# Patient Record
Sex: FEMALE | Race: WHITE | Employment: OTHER
[De-identification: names, ages, dates, MRNs, and addresses within clinical notes are randomized per-mention and may not be internally consistent; named-entity substitution may affect disease eponyms.]

---

## 2017-02-08 ENCOUNTER — OFFICE VISIT (OUTPATIENT)
Dept: ONCOLOGY | Facility: CLINIC | Age: 62
End: 2017-02-08

## 2017-02-08 VITALS
WEIGHT: 150 LBS | HEART RATE: 86 BPM | DIASTOLIC BLOOD PRESSURE: 73 MMHG | SYSTOLIC BLOOD PRESSURE: 127 MMHG | BODY MASS INDEX: 22.73 KG/M2 | TEMPERATURE: 98.1 F | HEIGHT: 68 IN

## 2017-02-08 DIAGNOSIS — G62.9 NEUROPATHY: ICD-10-CM

## 2017-02-08 DIAGNOSIS — M81.0 OSTEOPOROSIS: ICD-10-CM

## 2017-02-08 DIAGNOSIS — C50.912 MALIGNANT NEOPLASM OF LEFT FEMALE BREAST, UNSPECIFIED SITE OF BREAST: Primary | ICD-10-CM

## 2017-02-08 DIAGNOSIS — Z12.39 ENCOUNTER FOR BREAST CANCER SCREENING OTHER THAN MAMMOGRAM: ICD-10-CM

## 2017-02-08 PROCEDURE — 99214 OFFICE O/P EST MOD 30 MIN: CPT | Performed by: INTERNAL MEDICINE

## 2017-02-08 RX ORDER — ZONISAMIDE 50 MG/1
2 CAPSULE ORAL NIGHTLY
COMMUNITY
Start: 2017-01-24 | End: 2017-06-21 | Stop reason: ALTCHOICE

## 2017-02-08 RX ORDER — AZITHROMYCIN 250 MG/1
TABLET, FILM COATED ORAL
Qty: 1 PACKET | Refills: 0 | Status: SHIPPED | OUTPATIENT
Start: 2017-02-08 | End: 2020-11-06 | Stop reason: ALTCHOICE

## 2017-03-07 RX ORDER — SODIUM CHLORIDE 9 MG/ML
100 INJECTION, SOLUTION INTRAVENOUS CONTINUOUS
Status: CANCELLED | OUTPATIENT
Start: 2017-06-21

## 2017-03-07 RX ORDER — METHYLPREDNISOLONE SODIUM SUCCINATE 125 MG/2ML
125 INJECTION, POWDER, LYOPHILIZED, FOR SOLUTION INTRAMUSCULAR; INTRAVENOUS ONCE
Status: CANCELLED | OUTPATIENT
Start: 2017-06-21 | End: 2017-03-13

## 2017-03-07 RX ORDER — 0.9 % SODIUM CHLORIDE 0.9 %
10 VIAL (ML) INJECTION ONCE
Status: CANCELLED | OUTPATIENT
Start: 2017-06-21 | End: 2017-03-13

## 2017-03-07 RX ORDER — DIPHENHYDRAMINE HYDROCHLORIDE 50 MG/ML
50 INJECTION INTRAMUSCULAR; INTRAVENOUS ONCE
Status: CANCELLED | OUTPATIENT
Start: 2017-06-21 | End: 2017-03-13

## 2017-03-20 RX ORDER — EXEMESTANE 25 MG/1
TABLET ORAL
Qty: 90 TABLET | Refills: 3 | Status: SHIPPED | OUTPATIENT
Start: 2017-03-20 | End: 2018-03-09 | Stop reason: SDUPTHER

## 2017-05-17 RX ORDER — ALPRAZOLAM 0.5 MG/1
0.5 TABLET ORAL 2 TIMES DAILY PRN
Qty: 60 TABLET | Refills: 5 | Status: SHIPPED | OUTPATIENT
Start: 2017-05-17 | End: 2017-12-08 | Stop reason: SDUPTHER

## 2017-06-21 ENCOUNTER — HOSPITAL ENCOUNTER (OUTPATIENT)
Dept: INFUSION THERAPY | Age: 62
Discharge: HOME | End: 2017-06-21
Payer: COMMERCIAL

## 2017-06-21 VITALS
SYSTOLIC BLOOD PRESSURE: 133 MMHG | RESPIRATION RATE: 22 BRPM | DIASTOLIC BLOOD PRESSURE: 76 MMHG | TEMPERATURE: 98.6 F | HEART RATE: 80 BPM

## 2017-06-21 DIAGNOSIS — M81.0 OSTEOPOROSIS: ICD-10-CM

## 2017-06-21 PROCEDURE — 6360000002 HC RX W HCPCS

## 2017-06-21 PROCEDURE — 96401 CHEMO ANTI-NEOPL SQ/IM: CPT

## 2017-06-21 RX ORDER — METHYLPREDNISOLONE SODIUM SUCCINATE 125 MG/2ML
125 INJECTION, POWDER, LYOPHILIZED, FOR SOLUTION INTRAMUSCULAR; INTRAVENOUS ONCE
Status: CANCELLED | OUTPATIENT
Start: 2017-06-21 | End: 2017-06-21

## 2017-06-21 RX ORDER — 0.9 % SODIUM CHLORIDE 0.9 %
10 VIAL (ML) INJECTION ONCE
Status: CANCELLED | OUTPATIENT
Start: 2017-06-21 | End: 2017-06-21

## 2017-06-21 RX ORDER — SODIUM CHLORIDE 9 MG/ML
100 INJECTION, SOLUTION INTRAVENOUS CONTINUOUS
Status: CANCELLED | OUTPATIENT
Start: 2017-06-21

## 2017-06-21 RX ORDER — DIPHENHYDRAMINE HYDROCHLORIDE 50 MG/ML
50 INJECTION INTRAMUSCULAR; INTRAVENOUS ONCE
Status: CANCELLED | OUTPATIENT
Start: 2017-06-21 | End: 2017-06-21

## 2017-06-21 RX ADMIN — DENOSUMAB 60 MG: 60 INJECTION SUBCUTANEOUS at 15:32

## 2017-08-11 ENCOUNTER — OFFICE VISIT (OUTPATIENT)
Dept: ONCOLOGY | Age: 62
End: 2017-08-11
Payer: COMMERCIAL

## 2017-08-11 VITALS
SYSTOLIC BLOOD PRESSURE: 150 MMHG | BODY MASS INDEX: 21.82 KG/M2 | HEIGHT: 68 IN | RESPIRATION RATE: 16 BRPM | TEMPERATURE: 98.4 F | DIASTOLIC BLOOD PRESSURE: 92 MMHG | WEIGHT: 144 LBS | HEART RATE: 94 BPM

## 2017-08-11 DIAGNOSIS — G62.9 NEUROPATHY: ICD-10-CM

## 2017-08-11 DIAGNOSIS — C50.912 MALIGNANT NEOPLASM OF LEFT FEMALE BREAST, UNSPECIFIED SITE OF BREAST: Primary | ICD-10-CM

## 2017-08-11 PROCEDURE — 99214 OFFICE O/P EST MOD 30 MIN: CPT | Performed by: INTERNAL MEDICINE

## 2017-08-11 RX ORDER — DULOXETIN HYDROCHLORIDE 20 MG/1
20 CAPSULE, DELAYED RELEASE ORAL DAILY
Qty: 30 CAPSULE | Refills: 3 | Status: SHIPPED | OUTPATIENT
Start: 2017-08-11 | End: 2017-11-27 | Stop reason: SDUPTHER

## 2017-09-01 ENCOUNTER — HOSPITAL ENCOUNTER (OUTPATIENT)
Dept: WOMENS IMAGING | Age: 62
Discharge: HOME | End: 2017-09-01
Payer: COMMERCIAL

## 2017-09-01 ENCOUNTER — HOSPITAL ENCOUNTER (OUTPATIENT)
Age: 62
Setting detail: SPECIMEN
Discharge: HOME | End: 2017-09-01
Payer: COMMERCIAL

## 2017-09-01 DIAGNOSIS — Z12.39 ENCOUNTER FOR BREAST CANCER SCREENING OTHER THAN MAMMOGRAM: ICD-10-CM

## 2017-09-01 DIAGNOSIS — C50.912 MALIGNANT NEOPLASM OF LEFT FEMALE BREAST, UNSPECIFIED SITE OF BREAST: ICD-10-CM

## 2017-09-01 PROCEDURE — G0145 SCR C/V CYTO,THINLAYER,RESCR: HCPCS

## 2017-09-01 PROCEDURE — G0202 SCR MAMMO BI INCL CAD: HCPCS

## 2017-09-11 LAB — CYTOLOGY REPORT: NORMAL

## 2017-09-12 ENCOUNTER — OFFICE VISIT (OUTPATIENT)
Dept: PODIATRY | Age: 62
End: 2017-09-12
Payer: COMMERCIAL

## 2017-09-12 ENCOUNTER — HOSPITAL ENCOUNTER (OUTPATIENT)
Age: 62
Discharge: HOME | End: 2017-09-12
Payer: COMMERCIAL

## 2017-09-12 ENCOUNTER — HOSPITAL ENCOUNTER (OUTPATIENT)
Dept: GENERAL RADIOLOGY | Age: 62
Discharge: HOME | End: 2017-09-12
Payer: COMMERCIAL

## 2017-09-12 VITALS
DIASTOLIC BLOOD PRESSURE: 83 MMHG | RESPIRATION RATE: 20 BRPM | HEIGHT: 68 IN | HEART RATE: 73 BPM | SYSTOLIC BLOOD PRESSURE: 141 MMHG | TEMPERATURE: 97.6 F

## 2017-09-12 DIAGNOSIS — M79.672 PAIN IN LEFT FOOT: ICD-10-CM

## 2017-09-12 DIAGNOSIS — M20.12 HALLUX VALGUS, LEFT: ICD-10-CM

## 2017-09-12 DIAGNOSIS — M20.12 HALLUX VALGUS, LEFT: Primary | ICD-10-CM

## 2017-09-12 DIAGNOSIS — M21.272 METATARSUS PRIMUS ELEVATUS, LEFT: ICD-10-CM

## 2017-09-12 PROCEDURE — 73630 X-RAY EXAM OF FOOT: CPT

## 2017-09-12 PROCEDURE — 99203 OFFICE O/P NEW LOW 30 MIN: CPT | Performed by: PODIATRIST

## 2017-09-20 PROBLEM — G63 NEUROPATHY ASSOCIATED WITH CANCER (HCC): Status: ACTIVE | Noted: 2017-09-20

## 2017-09-20 PROBLEM — C80.1 NEUROPATHY ASSOCIATED WITH CANCER (HCC): Status: ACTIVE | Noted: 2017-09-20

## 2017-09-26 ENCOUNTER — OFFICE VISIT (OUTPATIENT)
Dept: PODIATRY | Age: 62
End: 2017-09-26
Payer: COMMERCIAL

## 2017-09-26 VITALS
SYSTOLIC BLOOD PRESSURE: 128 MMHG | RESPIRATION RATE: 16 BRPM | TEMPERATURE: 98.5 F | HEIGHT: 68 IN | HEART RATE: 87 BPM | DIASTOLIC BLOOD PRESSURE: 78 MMHG

## 2017-09-26 DIAGNOSIS — M21.272 METATARSUS PRIMUS ELEVATUS, LEFT: ICD-10-CM

## 2017-09-26 DIAGNOSIS — M20.12 HALLUX VALGUS, LEFT: Primary | ICD-10-CM

## 2017-09-26 DIAGNOSIS — M79.672 PAIN IN LEFT FOOT: ICD-10-CM

## 2017-09-26 PROCEDURE — 99213 OFFICE O/P EST LOW 20 MIN: CPT | Performed by: PODIATRIST

## 2017-09-26 NOTE — PROGRESS NOTES
Subjective:      Patient ID: Aman Trejo is a 63 y.o. female. Presents for surgical consult   Cc: Left foot pain         \"Misery index slightly better than initial visit , but\"  HPI left 1st MPJ pain x 2-3 years progressive          Overall deformity 15-20 years         Only treatment ; shoe modifications ; limited relief     Review of Systems   MRR: last visit 9/12/2017            Post chemotherapy NEUROPATHY  Past Medical History:   Diagnosis Date    Anxiety     Bone spur     ON SPINE-BACK PAIN    Breast cancer ([address] 2014    LEFT     Depression     Essential hypertension     Osteoarthritis     Seasonal allergies     Wears dentures     FULL UPPER/PARTIAL LOWER/INSTRUCTED NOT TO USE ADHESIVE    Wrist fracture 2005     Past Surgical History:   Procedure Laterality Date    BREAST BIOPSY  2-18-14    Needle aspiration biopsy    BREAST RECONSTRUCTION  4-2015    Still in progress, initiated in April 2015    BREAST RECONSTRUCTION Bilateral     BREAST SURGERY Left 3/18/14    Dr.Wenger Tim Camacho    CARPAL TUNNEL RELEASE  2005    right    MASTECTOMY Left 03/18/2014    with sentinel node biopsy    OTHER SURGICAL HISTORY  approx 2005    Tendon surgery (Right hand)    OTHER SURGICAL HISTORY      PORT REMOVAL    TUNNELED VENOUS PORT PLACEMENT Right 4/30/14     Allergies   Allergen Reactions    No Known Allergies        Current Outpatient Prescriptions:     traMADol (ULTRAM) 50 MG tablet, TAKE 1-2 TABLETS BY MOUTH DAILY PRN, Disp: 60 tablet, Rfl: 0    Na Sulfate-K Sulfate-Mg Sulf (SUPREP BOWEL PREP KIT) 17.5-3.13-1.6 GM/180ML SOLN, Take as directed, Disp: 2 Bottle, Rfl: 0    DULoxetine (CYMBALTA) 20 MG extended release capsule, Take 1 capsule by mouth daily, Disp: 30 capsule, Rfl: 3    lisinopril (PRINIVIL;ZESTRIL) 5 MG tablet, TAKE 1 TABLET BY MOUTH DAILY, Disp: 60 tablet, Rfl: 3    Biotin 17977 MCG TBDP, Take by mouth daily, Disp: , Rfl:     ALPRAZolam (XANAX) 0.5 MG tablet, Take 1 tablet by mouth 2 times daily as needed for Anxiety, Disp: 60 tablet, Rfl: 5    exemestane (AROMASIN) 25 MG chemo tablet, TAKE 1 TABLET BY MOUTH DAILY, Disp: 90 tablet, Rfl: 3    prochlorperazine (COMPAZINE) 10 MG tablet, TAKE 1 TABLET BY MOUTH EVERY 6 HOURS AS NEEDED FOR NAUSEA, Disp: 60 tablet, Rfl: 1    b complex vitamins capsule, Take 1 capsule by mouth daily, Disp: , Rfl:     Dextromethorphan-Guaifenesin (MUCINEX DM)  MG TB12, Take 1 tablet by mouth daily as needed, Disp: , Rfl:     fexofenadine (ALLEGRA) 180 MG tablet, Take 180 mg by mouth daily , Disp: , Rfl:     Calcium 1500 MG TABS, Take by mouth daily, Disp: , Rfl:     Cholecalciferol (VITAMIN D3) 3000 UNITS TABS, Take by mouth daily, Disp: , Rfl:     aspirin 81 MG tablet, Take 81 mg by mouth daily. , Disp: , Rfl:         Objective:   Physical Exam 63 Y/O WF , NAD, A&O x 4, WD/WN                           VSS, Afebrile,      LLE ; foot ; +PMT on prominent 1st MPJ exostosis / eminence                         \"+trackbound\" exam on R.O.M. reduction of HAV                         X-ray : **surpringly good bone quality v. Dexa scan review                                   Long 1st Metatarsal ; +1-2mm parabola                                    TSP 7+                                   FISHER 35                                   IM 14                                    Overall ; stage 3/4 HAV combination deformity                          Vascular : 2/4 readily palpable pedal pulses / +CFT , 2 sec  , +hair growth to hallux Left                         Neuro : +2 DTR(s)                                       EHL / FHL intact function                                       2-pt and light touch epicritic decreased dorsal and plantar forefoot and toes                                                                Assessment:      Stage 3/4 HAV ; combination deformity -- painful  Bone quality acceptable   Spontaneous healing parameters adequate     REC: Adán osteotomy / HAV

## 2017-09-26 NOTE — MR AVS SNAPSHOT
After Visit Summary             Gian Salter   2017 3:30 PM   Office Visit    Description:  Female : 1955   Provider:  Tonja Stack DPM   Department:  [facility] Podiatry              Your Follow-Up and Future Appointments         Below is a list of your follow-up and future appointments. This may not be a complete list as you may have made appointments directly with providers that we are not aware of or your providers may have made some for you. Please call your providers to confirm appointments. It is important to keep your appointments. Please bring your current insurance card, photo ID, co-pay, and all medication bottles to your appointment. If self-pay, payment is expected at the time of service. Your To-Do List     Future Appointments Provider Department Dept Phone    2017 3:30 PM [facility] MED ONC ROOM 9 [facility] AT THE [facility] MED [phone]    2018 3:40 PM Carmen Flores  [facility] [phone]    Please arrive 15 minutes prior to appointment, bring photo ID and insurance card. 3/21/2018 3:30 PM MD Makenna Rosario [phone]    Please arrive 15 minutes prior to appointment, bring photo ID and insurance card. Information from Your Visit        Department     Name Address Phone Fax    [address]  [place] [address] [phone]      You Were Seen for:         Comments    Hallux valgus, left   [[id number]]         Vital Signs     Blood Pressure Pulse Temperature Respirations Height Smoking Status    128/78 87 98.5 °F (36.9 °C) (Tympanic) 16 5' 8\" (1.727 m) Former Smoker      Instructions     Patient Discharge Instructions    CALL [phone] for questions regarding care. OFFICE HOURS ARE WED AND Thursday 8 A. M. TO 4:30 P. M. And subject to change without notice    Discharge instructions for the patient's plan of care.        Left foot Dr. Marly Josue to call with surgery date in November. Next appointment:  Future Appointments  Date Time Provider Yang Leóni   [facility]2017 3[facility]30 [facility] [place]   2/14/2018 3:40 PM MD Earle Calderonf Onc [facility]   3/21/2018 3:30 PM MD Samia Zamora         Comments: We hope we gave you VERY GOOD care today! Medications and Orders      Your Current Medications Are              traMADol (ULTRAM) 50 MG tablet TAKE 1-2 TABLETS BY MOUTH DAILY PRN    Na Sulfate-K Sulfate-Mg Sulf (SUPREP BOWEL PREP KIT) 17.5-3.13-1.6 GM/180ML SOLN Take as directed    DULoxetine (CYMBALTA) 20 MG extended release capsule Take 1 capsule by mouth daily    lisinopril (PRINIVIL;ZESTRIL) 5 MG tablet TAKE 1 TABLET BY MOUTH DAILY    Biotin 66633 MCG TBDP Take by mouth daily    ALPRAZolam (XANAX) 0.5 MG tablet Take 1 tablet by mouth 2 times daily as needed for Anxiety    exemestane (AROMASIN) 25 MG chemo tablet TAKE 1 TABLET BY MOUTH DAILY    prochlorperazine (COMPAZINE) 10 MG tablet TAKE 1 TABLET BY MOUTH EVERY 6 HOURS AS NEEDED FOR NAUSEA    b complex vitamins capsule Take 1 capsule by mouth daily    Dextromethorphan-Guaifenesin (MUCINEX DM)  MG TB12 Take 1 tablet by mouth daily as needed    fexofenadine (ALLEGRA) 180 MG tablet Take 180 mg by mouth daily     Calcium 1500 MG TABS Take by mouth daily    Cholecalciferol (VITAMIN D3) 3000 UNITS TABS Take by mouth daily    aspirin 81 MG tablet Take 81 mg by mouth daily.       Allergies              No Known Allergies          Additional Information        Basic Information     Date Of Birth Sex Race Ethnicity Preferred Language    1955 Female White Non-/Non  English      Problem List as of 9/26/2017  Date Reviewed: 9/26/2017                Neuropathy associated with cancer [facility]) / breast cancer treatment    Chronic bilateral low back pain without sciatica 4. Enter your Social Security Number (xxx-[id number]) and Date of Birth (mm/dd/yyyy) as indicated and click Submit. You will be taken to the next sign-up page. 5. Create a [employer] ID. This will be your [employer] login ID and cannot be changed, so think of one that is secure and easy to remember. 6. Create a [employer] password. You can change your password at any time. 7. Enter your Password Reset Question and Answer. This can be used at a later time if you forget your password. 8. Enter your e-mail address. You will receive e-mail notification when new information is available in [address] 9. Click Sign Up. You can now view your medical record. Additional Information  If you have questions, please contact the physician practice where you receive care. Remember, [employer] is NOT to be used for urgent needs. For medical emergencies, dial 911. For questions regarding your [employer] account call [phone]. If you have a clinical question, please call your doctor's office.

## 2017-09-26 NOTE — PATIENT INSTRUCTIONS
Patient Discharge Instructions    CALL [phone] for questions regarding care. OFFICE HOURS ARE WED AND Thursday 8 A. M. TO 4:30 P. M. And subject to change without notice    Discharge instructions for the patient's plan of care. Left foot  Dr. Marly Josue to call with surgery date in November. Next appointment:  Future Appointments  Date Time Provider Yang Skelton   12/20/2017 3:30 PM Mansoor Singh MED ONC ROOM 9 [facility] MED ONC [place]   2/14/2018 3:40 PM Cori Rendon MD [place] [facility] [place]   3/21/2018 3:30 PM MD Samia Zamora         Comments: We hope we gave you VERY GOOD care today!

## 2017-10-03 ENCOUNTER — TELEPHONE (OUTPATIENT)
Dept: GASTROENTEROLOGY | Age: 62
End: 2017-10-03

## 2017-11-07 ENCOUNTER — ANESTHESIA EVENT (OUTPATIENT)
Dept: OPERATING ROOM | Age: 62
End: 2017-11-07
Payer: COMMERCIAL

## 2017-11-07 PROBLEM — Q66.6: Status: ACTIVE | Noted: 2017-11-07

## 2017-11-07 PROBLEM — Q66.219: Status: ACTIVE | Noted: 2017-11-07

## 2017-11-07 PROBLEM — M20.12 HALLUX VALGUS (ACQUIRED), LEFT FOOT: Status: ACTIVE | Noted: 2017-11-07

## 2017-11-08 ENCOUNTER — ANESTHESIA (OUTPATIENT)
Dept: OPERATING ROOM | Age: 62
End: 2017-11-08
Payer: COMMERCIAL

## 2017-11-08 ENCOUNTER — HOSPITAL ENCOUNTER (OUTPATIENT)
Age: 62
Setting detail: OUTPATIENT SURGERY
Discharge: HOME | End: 2017-11-08
Attending: PODIATRIST | Admitting: PODIATRIST
Payer: COMMERCIAL

## 2017-11-08 VITALS
WEIGHT: 138 LBS | SYSTOLIC BLOOD PRESSURE: 127 MMHG | OXYGEN SATURATION: 97 % | DIASTOLIC BLOOD PRESSURE: 77 MMHG | HEIGHT: 68 IN | BODY MASS INDEX: 20.92 KG/M2 | HEART RATE: 75 BPM | TEMPERATURE: 97.6 F | RESPIRATION RATE: 16 BRPM

## 2017-11-08 VITALS — TEMPERATURE: 101.7 F | SYSTOLIC BLOOD PRESSURE: 85 MMHG | OXYGEN SATURATION: 97 % | DIASTOLIC BLOOD PRESSURE: 42 MMHG

## 2017-11-08 PROCEDURE — 2580000003 HC RX 258: Performed by: PODIATRIST

## 2017-11-08 PROCEDURE — 88304 TISSUE EXAM BY PATHOLOGIST: CPT

## 2017-11-08 PROCEDURE — 6360000002 HC RX W HCPCS: Performed by: PODIATRIST

## 2017-11-08 PROCEDURE — 97116 GAIT TRAINING THERAPY: CPT

## 2017-11-08 PROCEDURE — 2500000003 HC RX 250 WO HCPCS: Performed by: PODIATRIST

## 2017-11-08 PROCEDURE — 3600000013 HC SURGERY LEVEL 3 ADDTL 15MIN: Performed by: PODIATRIST

## 2017-11-08 PROCEDURE — 7100000010 HC PHASE II RECOVERY - FIRST 15 MIN: Performed by: PODIATRIST

## 2017-11-08 PROCEDURE — 3600000003 HC SURGERY LEVEL 3 BASE: Performed by: PODIATRIST

## 2017-11-08 PROCEDURE — 6370000000 HC RX 637 (ALT 250 FOR IP): Performed by: PODIATRIST

## 2017-11-08 PROCEDURE — 2720000010 HC SURG SUPPLY STERILE: Performed by: PODIATRIST

## 2017-11-08 PROCEDURE — C1713 ANCHOR/SCREW BN/BN,TIS/BN: HCPCS | Performed by: PODIATRIST

## 2017-11-08 PROCEDURE — 2500000003 HC RX 250 WO HCPCS: Performed by: NURSE ANESTHETIST, CERTIFIED REGISTERED

## 2017-11-08 PROCEDURE — 7100000001 HC PACU RECOVERY - ADDTL 15 MIN: Performed by: PODIATRIST

## 2017-11-08 PROCEDURE — 6360000002 HC RX W HCPCS: Performed by: NURSE ANESTHETIST, CERTIFIED REGISTERED

## 2017-11-08 PROCEDURE — 7100000011 HC PHASE II RECOVERY - ADDTL 15 MIN: Performed by: PODIATRIST

## 2017-11-08 PROCEDURE — 88311 DECALCIFY TISSUE: CPT

## 2017-11-08 PROCEDURE — 3700000001 HC ADD 15 MINUTES (ANESTHESIA): Performed by: PODIATRIST

## 2017-11-08 PROCEDURE — 7100000000 HC PACU RECOVERY - FIRST 15 MIN: Performed by: PODIATRIST

## 2017-11-08 PROCEDURE — 28296 COR HLX VLGS DSTL MTAR OSTEO: CPT | Performed by: PODIATRIST

## 2017-11-08 PROCEDURE — 3700000000 HC ANESTHESIA ATTENDED CARE: Performed by: PODIATRIST

## 2017-11-08 RX ORDER — MIDAZOLAM HYDROCHLORIDE 1 MG/ML
INJECTION INTRAMUSCULAR; INTRAVENOUS PRN
Status: DISCONTINUED | OUTPATIENT
Start: 2017-11-08 | End: 2017-11-08 | Stop reason: SDUPTHER

## 2017-11-08 RX ORDER — PROMETHAZINE HYDROCHLORIDE 25 MG/ML
6.25 INJECTION, SOLUTION INTRAMUSCULAR; INTRAVENOUS
Status: DISCONTINUED | OUTPATIENT
Start: 2017-11-08 | End: 2017-11-08 | Stop reason: HOSPADM

## 2017-11-08 RX ORDER — FENTANYL CITRATE 50 UG/ML
25 INJECTION, SOLUTION INTRAMUSCULAR; INTRAVENOUS EVERY 5 MIN PRN
Status: DISCONTINUED | OUTPATIENT
Start: 2017-11-08 | End: 2017-11-08 | Stop reason: HOSPADM

## 2017-11-08 RX ORDER — MORPHINE SULFATE 2 MG/ML
2 INJECTION, SOLUTION INTRAMUSCULAR; INTRAVENOUS
Status: DISCONTINUED | OUTPATIENT
Start: 2017-11-08 | End: 2017-11-08 | Stop reason: HOSPADM

## 2017-11-08 RX ORDER — KETOROLAC TROMETHAMINE 15 MG/ML
15 INJECTION, SOLUTION INTRAMUSCULAR; INTRAVENOUS ONCE
Status: COMPLETED | OUTPATIENT
Start: 2017-11-08 | End: 2017-11-08

## 2017-11-08 RX ORDER — ONDANSETRON 2 MG/ML
4 INJECTION INTRAMUSCULAR; INTRAVENOUS EVERY 6 HOURS PRN
Status: DISCONTINUED | OUTPATIENT
Start: 2017-11-08 | End: 2017-11-08 | Stop reason: HOSPADM

## 2017-11-08 RX ORDER — METOCLOPRAMIDE HYDROCHLORIDE 5 MG/ML
10 INJECTION INTRAMUSCULAR; INTRAVENOUS
Status: DISCONTINUED | OUTPATIENT
Start: 2017-11-08 | End: 2017-11-08 | Stop reason: HOSPADM

## 2017-11-08 RX ORDER — HYDROCODONE BITARTRATE AND ACETAMINOPHEN 5; 325 MG/1; MG/1
2 TABLET ORAL PRN
Status: COMPLETED | OUTPATIENT
Start: 2017-11-08 | End: 2017-11-08

## 2017-11-08 RX ORDER — BUPIVACAINE HYDROCHLORIDE AND EPINEPHRINE 5; 5 MG/ML; UG/ML
INJECTION, SOLUTION EPIDURAL; INTRACAUDAL; PERINEURAL PRN
Status: DISCONTINUED | OUTPATIENT
Start: 2017-11-08 | End: 2017-11-08 | Stop reason: HOSPADM

## 2017-11-08 RX ORDER — KETOROLAC TROMETHAMINE 30 MG/ML
INJECTION, SOLUTION INTRAMUSCULAR; INTRAVENOUS PRN
Status: DISCONTINUED | OUTPATIENT
Start: 2017-11-08 | End: 2017-11-08 | Stop reason: SDUPTHER

## 2017-11-08 RX ORDER — SODIUM CHLORIDE 0.9 % (FLUSH) 0.9 %
10 SYRINGE (ML) INJECTION PRN
Status: DISCONTINUED | OUTPATIENT
Start: 2017-11-08 | End: 2017-11-08 | Stop reason: HOSPADM

## 2017-11-08 RX ORDER — FENTANYL CITRATE 50 UG/ML
50 INJECTION, SOLUTION INTRAMUSCULAR; INTRAVENOUS EVERY 5 MIN PRN
Status: DISCONTINUED | OUTPATIENT
Start: 2017-11-08 | End: 2017-11-08 | Stop reason: HOSPADM

## 2017-11-08 RX ORDER — HYDROCODONE BITARTRATE AND IBUPROFEN 7.5; 2 MG/1; MG/1
1 TABLET, FILM COATED ORAL EVERY 6 HOURS PRN
Qty: 23 TABLET | Refills: 0 | Status: SHIPPED | OUTPATIENT
Start: 2017-11-08 | End: 2020-11-06 | Stop reason: ALTCHOICE

## 2017-11-08 RX ORDER — KETOROLAC TROMETHAMINE 30 MG/ML
30 INJECTION, SOLUTION INTRAMUSCULAR; INTRAVENOUS ONCE
Status: COMPLETED | OUTPATIENT
Start: 2017-11-08 | End: 2017-11-08

## 2017-11-08 RX ORDER — SODIUM CHLORIDE 0.9 % (FLUSH) 0.9 %
10 SYRINGE (ML) INJECTION EVERY 12 HOURS SCHEDULED
Status: DISCONTINUED | OUTPATIENT
Start: 2017-11-08 | End: 2017-11-08 | Stop reason: HOSPADM

## 2017-11-08 RX ORDER — LIDOCAINE HYDROCHLORIDE 20 MG/ML
INJECTION, SOLUTION INFILTRATION; PERINEURAL PRN
Status: DISCONTINUED | OUTPATIENT
Start: 2017-11-08 | End: 2017-11-08 | Stop reason: SDUPTHER

## 2017-11-08 RX ORDER — FENTANYL CITRATE 50 UG/ML
INJECTION, SOLUTION INTRAMUSCULAR; INTRAVENOUS PRN
Status: DISCONTINUED | OUTPATIENT
Start: 2017-11-08 | End: 2017-11-08 | Stop reason: SDUPTHER

## 2017-11-08 RX ORDER — PROPOFOL 10 MG/ML
INJECTION, EMULSION INTRAVENOUS PRN
Status: DISCONTINUED | OUTPATIENT
Start: 2017-11-08 | End: 2017-11-08 | Stop reason: SDUPTHER

## 2017-11-08 RX ORDER — ONDANSETRON 2 MG/ML
INJECTION INTRAMUSCULAR; INTRAVENOUS PRN
Status: DISCONTINUED | OUTPATIENT
Start: 2017-11-08 | End: 2017-11-08 | Stop reason: SDUPTHER

## 2017-11-08 RX ORDER — SODIUM CHLORIDE, SODIUM LACTATE, POTASSIUM CHLORIDE, CALCIUM CHLORIDE 600; 310; 30; 20 MG/100ML; MG/100ML; MG/100ML; MG/100ML
INJECTION, SOLUTION INTRAVENOUS CONTINUOUS
Status: DISCONTINUED | OUTPATIENT
Start: 2017-11-08 | End: 2017-11-08 | Stop reason: HOSPADM

## 2017-11-08 RX ORDER — HYDROCODONE BITARTRATE AND ACETAMINOPHEN 5; 325 MG/1; MG/1
1 TABLET ORAL PRN
Status: COMPLETED | OUTPATIENT
Start: 2017-11-08 | End: 2017-11-08

## 2017-11-08 RX ORDER — DEXAMETHASONE SODIUM PHOSPHATE 4 MG/ML
INJECTION, SOLUTION INTRA-ARTICULAR; INTRALESIONAL; INTRAMUSCULAR; INTRAVENOUS; SOFT TISSUE PRN
Status: DISCONTINUED | OUTPATIENT
Start: 2017-11-08 | End: 2017-11-08 | Stop reason: SDUPTHER

## 2017-11-08 RX ADMIN — KETOROLAC TROMETHAMINE 30 MG: 30 INJECTION, SOLUTION INTRAMUSCULAR; INTRAVENOUS at 08:41

## 2017-11-08 RX ADMIN — PROPOFOL 200 MG: 10 INJECTION, EMULSION INTRAVENOUS at 07:24

## 2017-11-08 RX ADMIN — FENTANYL CITRATE 25 MCG: 50 INJECTION INTRAMUSCULAR; INTRAVENOUS at 09:16

## 2017-11-08 RX ADMIN — ONDANSETRON 4 MG: 2 INJECTION INTRAMUSCULAR; INTRAVENOUS at 07:51

## 2017-11-08 RX ADMIN — KETOROLAC TROMETHAMINE 30 MG: 30 INJECTION, SOLUTION INTRAMUSCULAR at 10:15

## 2017-11-08 RX ADMIN — CEFAZOLIN SODIUM 2 G: 2 SOLUTION INTRAVENOUS at 07:17

## 2017-11-08 RX ADMIN — SODIUM CHLORIDE, POTASSIUM CHLORIDE, SODIUM LACTATE AND CALCIUM CHLORIDE: 600; 310; 30; 20 INJECTION, SOLUTION INTRAVENOUS at 06:40

## 2017-11-08 RX ADMIN — LIDOCAINE HYDROCHLORIDE 100 MG: 20 INJECTION, SOLUTION INFILTRATION; PERINEURAL at 07:24

## 2017-11-08 RX ADMIN — MIDAZOLAM HYDROCHLORIDE 2 MG: 1 INJECTION, SOLUTION INTRAMUSCULAR; INTRAVENOUS at 07:20

## 2017-11-08 RX ADMIN — SODIUM CHLORIDE, POTASSIUM CHLORIDE, SODIUM LACTATE AND CALCIUM CHLORIDE: 600; 310; 30; 20 INJECTION, SOLUTION INTRAVENOUS at 07:45

## 2017-11-08 RX ADMIN — KETOROLAC TROMETHAMINE 15 MG: 15 INJECTION, SOLUTION INTRAMUSCULAR; INTRAVENOUS at 11:21

## 2017-11-08 RX ADMIN — FENTANYL CITRATE 50 MCG: 50 INJECTION INTRAMUSCULAR; INTRAVENOUS at 07:22

## 2017-11-08 RX ADMIN — HYDROCODONE BITARTRATE AND ACETAMINOPHEN 2 TABLET: 5; 325 TABLET ORAL at 09:52

## 2017-11-08 RX ADMIN — DEXAMETHASONE SODIUM PHOSPHATE 8 MG: 4 INJECTION, SOLUTION INTRAMUSCULAR; INTRAVENOUS at 07:43

## 2017-11-08 ASSESSMENT — PAIN DESCRIPTION - LOCATION
LOCATION: FOOT

## 2017-11-08 ASSESSMENT — PAIN SCALES - GENERAL
PAINLEVEL_OUTOF10: 5
PAINLEVEL_OUTOF10: 8
PAINLEVEL_OUTOF10: 0
PAINLEVEL_OUTOF10: 4
PAINLEVEL_OUTOF10: 0
PAINLEVEL_OUTOF10: 8
PAINLEVEL_OUTOF10: 6
PAINLEVEL_OUTOF10: 0
PAINLEVEL_OUTOF10: 6
PAINLEVEL_OUTOF10: 3
PAINLEVEL_OUTOF10: 5

## 2017-11-08 ASSESSMENT — PAIN DESCRIPTION - PAIN TYPE
TYPE: SURGICAL PAIN

## 2017-11-08 ASSESSMENT — PAIN DESCRIPTION - DESCRIPTORS
DESCRIPTORS: SHARP
DESCRIPTORS: SORE;ACHING

## 2017-11-08 ASSESSMENT — PAIN DESCRIPTION - ORIENTATION
ORIENTATION: LEFT

## 2017-11-08 NOTE — BRIEF OP NOTE
Brief Postoperative Note    Davi Avila  YOB: 1955  [id number]    Pre-operative Diagnosis: Stage 3 HAV , Left foot    Post-operative Diagnosis: Same    Procedure: Lalitha Senate osteotomy Hallux valgus repair ,Left foot -- staple fixation x 2     Anesthesia: General    Surgeons/Assistants: Dr. Lacie Gallegos    Estimated Blood Loss: less than 50     Complications: None    Specimens: Was Obtained: bone    Findings: medial FT erosion of cartilage ; 1st MPJ                   Medial soft bone quality , 1st Metatarsal Left     Electronically signed by Tiffany Tenorio DPM on 11/8/2017 at 8:50 AM

## 2017-11-08 NOTE — ANESTHESIA POSTPROCEDURE EVALUATION
Department of Anesthesiology  Postprocedure Note    Patient: Shyanne Bentley  MRN: [id number]  YOB: 1955  Date of evaluation: 11/8/2017  Time:  10:15 AM     Procedure Summary     Date:  11/08/17 Room / Location:  [facility] OR 1 / [facility] OR    Anesthesia Start:  0718 Anesthesia Stop:  4012    Procedure:  FOOT BUNIONECTOMY-OSTEOTOMY, HALLUX VALGUS REPAIR (Left ) Diagnosis:  (STAGE 3 HALLUX VALGUS)    Surgeon:  Ivan Muro DPM Responsible Provider:  Patel Antonio CRNA    Anesthesia Type:  general ASA Status:  3          Anesthesia Type: general    Leann Phase I: Leann Score: 10    Leann Phase II:      Last vitals: Reviewed and per EMR flowsheets.        Anesthesia Post Evaluation    Patient location during evaluation: PACU  Patient participation: complete - patient participated  Level of consciousness: awake and alert  Pain score: 0  Airway patency: patent  Nausea & Vomiting: no nausea and no vomiting  Complications: no  Cardiovascular status: blood pressure returned to baseline  Respiratory status: acceptable  Hydration status: euvolemic

## 2017-11-08 NOTE — PROGRESS NOTES
Discharge instructions given to pt and mother. Both verbalize understanding,deny any questions and/or concerns. Pt ambulates to bathroom w/ standby assist, slow steady gait noted. Then d/c'd off unit in wheelchair w/ all belongings.

## 2017-11-08 NOTE — PROGRESS NOTES
Pt assisted up to bathroom with crutches and stand-by assist.  Pt tolerated well and denied and dizziness. Instructed on call light in bathroom, pt verbalized understanding.

## 2017-11-08 NOTE — OP NOTE
Patient visited in Phase 2 PACU --- pleasant w/o compliant , +void, +appetitie, -N/V,  -SOB, -calf pain   VSS, Afebrile, NAD, A&O x3  Bandage Left foot/leg C, D, & I   CFT  Left 1toes, wnl   HRRR  + BS x 4 quads   -hypogastric distention       IMP: stable post-op           Physical therapy completed / acceptable     P: OK for discharge w/ instructions and Rx(s)        See orders       Nati Weiner DPM / JULIA   11/8/2017 11:28 AM

## 2017-11-08 NOTE — PROGRESS NOTES
Discharge Criteria    Inpatients must meet Criteria 1 through 7. All other patients are either YES or N/A. If a NO is chosen then Anesthesia or Surgeon must be notified. 1.  Minimum 30 minutes after last dose of sedative medication, minimum 120 minutes after last dose of reversal agent. Yes      2. Systolic BP stable within 20 mmHg for 30 minutes & systolic BP between 90 & 179 or within 10 mmHg of baseline. Yes      3. Pulse between 60 and 100 or within 10 bpm of baseline. Yes      4. Spontaneous respiratory rate >/= 10 per minute. Yes      5. SaO2 >/= 95 or  >/= baseline. Yes      6. Able to cough and swallow or return to baseline function. Yes      7. Alert and oriented or return to baseline mental status. Yes      8. Demonstrates controlled, coordinated movements, ambulates with steady gait, or return to baseline activity function. Yes      9. Minimal or no pain or nausea, or at a level tolerable and acceptable to patient. Yes      10. Takes and retains oral fluids as allowed. Yes      11. Procedural / perioperative site stable. Minimal or no bleeding. Yes          12. If GI endoscopy procedure, minimal or no abdominal distention or passing flatus. N/A      13. Written discharge instructions and emergency telephone number provided. Yes      14. Accompanied by a responsible adult. Yes      Adult patient discharged from facility without responsible person meets above criteria plus the following:   a) remains awake without stimulus for 30 minutes     b) oriented appropriate for age      c) all vital signs stable   d) no significant risk of losing protective reflexes      e) able to maintain pre-procedure mobility without assistance   f) no nausea or dizziness      g) transportation arrangements that do not require patient to operate motor Vehicle.      Yes

## 2017-11-08 NOTE — PROGRESS NOTES
Phone: [phone]        Fax: [phone]  [facility]  Physical Therapy  Post-Op Gait Training    Date: 2017    Patient Name: Sandra Delgado          : 1955  (63 y.o.)    Attending Physician: Jun Maurice    Diagnosis: Bunionectomy    Reason for Referral:  [x] Crutch Training   [] Walker Training    [] Other:     Weight Bearing Status:  []Right Extremity  [x]Left Extremity  [x]NWB  []PWB    []WBAT  []TTWB    Treatment:  [x] Instructed in appropriate gait with crutches/walker  [x]   Crutches/walker fitted to patient at accurate height  [x] Instructed on gait training at level ground  [x] Instructed on gait training with use of stairs  [x] Instructed on sit to stand utilizing crutches/walker  [x]   Other:  Issued written instructions      Therapists Signature:  Leola Sanabria PTA [id number]                             Date: 2017

## 2017-11-09 NOTE — OP NOTE
CRNA.  The left foot was  prepped and draped in the usual sterile fashion. Hemostasis was obtained  utilizing an ankle tourniquet, which was elevated to 250 mmHg after a  period of limb exsanguination. Attention was directed to the left foot, large prominence was noted and  subsequently an 8-cm curvilinear incision was made over the dorsal medial  aspect of the first metatarsophalangeal joint, left foot. The incision was  deepened through and into the subcutaneus tissues and care was taken to  clamp and Bovie the intraincisional blood vessels. Blunt dissection   the subcutaneous tissue from the underlying deep fascia and  capsule of the joint. The long extensor tendon was then mobilized medially  and transection tenotomy of the short extensor tendon was subsequently  done. The lateral joint space was entered sharply with a vertical incision  and subsequently the fibular sesamoid would be decompressed and removed  along with the lateral joint space using sharp dissection technique and the  so called J-stroke. The fibular sesamoid was removed in toto and  subsequently sent to pathology. Moving to the medial aspect of the joint,  a linear periosteal capsular incision was made medial to the long extensor  tendon. Using sharp dissection techniques, the capsular envelope was  mobilized exposing the medial eminence and the cartilaginous joint space. Medial erosion of that cartilaginous space was noted. Moderate-to-severe  hypertrophy of the eminence was also noted. After adequate mobilization,  the medial eminence was resected with a sagittal saw. At this time, the  Sabino Hillmanugh osteotomy was fabricated with a measured excision of bone of about  2 to 3 mm. It also was done two-thirds of the diameter of the shaft. The bone in this area was slightly osteoporotic , a concern.    The  resultant capital fragment was then transposed laterally into a stable  decompressed position, excellent reduction of the preoperative deformity  was visualized. At this time, a specialized right medical staples of one a  10 x 10 mm and another an 8 x 8 mm were introduced into the surgical site  and stepwise placement of these from dorsal-to-plantar was done. The  lateral staple was the 10 x 10 and the medial was the 8 x 8 giving a slight  non-consistency in compression. An excellent stability was realized  intraoperatively and at this time, the medial first metatarsal would be  remodeled and overlying the edge would be additionally osteotomized and  smoothened. Copious lavage was done, and the joint space was checked for  position and range of motion, and they appeared to be clinically excellent. Derotation or closure of the capsule was done using a 2-0 Vicryl in an  over-and-over stitch. The subcutaneous tissues were reapproximated loosely  using a 3-0 Vicryl in a buried simple interrupted suture technique. The  skin was reapproximated with 4-0 nylon in a combination of horizontal  mattress and interposed simple interrupted sutures. Xeroform was placed  over the incision, and a dry sterile fluff compressive dressing was  applied. A debriefing was done and noted that the patient tolerated the anesthesia  and the procedure well without complication and would be transported to  phase I recovery room with the vital signs stable, afebrile, in apparent  satisfactory condition. The instruments, sponge, and needle counts were  correct.         Veronica Oleary    D: 11/08/2017 17:52:47       T: 11/08/2017 21:30:49     [email or web address]  Job#: [id number]     Doc#: [id number]

## 2017-11-10 LAB — SURGICAL PATHOLOGY REPORT: NORMAL

## 2017-11-14 ENCOUNTER — OFFICE VISIT (OUTPATIENT)
Dept: PODIATRY | Age: 62
End: 2017-11-14

## 2017-11-14 ENCOUNTER — HOSPITAL ENCOUNTER (OUTPATIENT)
Dept: GENERAL RADIOLOGY | Age: 62
Discharge: HOME | End: 2017-11-14
Payer: COMMERCIAL

## 2017-11-14 ENCOUNTER — HOSPITAL ENCOUNTER (OUTPATIENT)
Age: 62
Discharge: HOME | End: 2017-11-14
Payer: COMMERCIAL

## 2017-11-14 VITALS
DIASTOLIC BLOOD PRESSURE: 78 MMHG | SYSTOLIC BLOOD PRESSURE: 119 MMHG | WEIGHT: 138 LBS | HEART RATE: 98 BPM | RESPIRATION RATE: 16 BRPM | BODY MASS INDEX: 20.92 KG/M2 | HEIGHT: 68 IN | TEMPERATURE: 99.3 F

## 2017-11-14 DIAGNOSIS — Z47.89 ORTHOPEDIC AFTERCARE: ICD-10-CM

## 2017-11-14 DIAGNOSIS — Z47.89 ORTHOPEDIC AFTERCARE: Primary | ICD-10-CM

## 2017-11-14 DIAGNOSIS — Z78.9 PRESENCE OF SURGICAL INCISION: ICD-10-CM

## 2017-11-14 PROCEDURE — 73630 X-RAY EXAM OF FOOT: CPT

## 2017-11-14 PROCEDURE — 99024 POSTOP FOLLOW-UP VISIT: CPT | Performed by: PODIATRIST

## 2017-11-14 NOTE — PATIENT INSTRUCTIONS
Wound Management Patient Discharge Instructions    CALL [phone] for questions regarding care of your wounds. OFFICE HOURS ARE WED AND Thursday 8 A. M. TO 4:30 P. M. And subject to change without notice    Discharge instructions for the patient's plan of care. Wound care:keep dressing dry and intact  No weight bearing  Return in one week  Xray today prior to leaving    Next appointment:  Future Appointments  Date Time Provider Yang Skelton   11/21/2017 9:30 AM Magdiel Cabral DPM [place] [facility]   12/20/2017 3:30 PM SCHEDULE, [facility] MED ONC ROOM 9 [facility] MED ONC [place]   2/14/2018 3:40 PM Rigo Wright MD [place] [facility]   3/21/2018 3:30 PM MD Edita Gates         Comments:          SURVEY  You may be receiving a survey from [employer] regarding your visit today. Please complete the survey to enable us to provide the highest quality of care to you and your family. BILLING  Regarding your billing: This is a provider based clinic. Therefore you will receive 2 bills. One from the hospital billing service and one from E - the physicians billing service.

## 2017-11-21 ENCOUNTER — OFFICE VISIT (OUTPATIENT)
Dept: PODIATRY | Age: 62
End: 2017-11-21

## 2017-11-21 VITALS
TEMPERATURE: 96.9 F | DIASTOLIC BLOOD PRESSURE: 81 MMHG | SYSTOLIC BLOOD PRESSURE: 135 MMHG | RESPIRATION RATE: 16 BRPM | HEIGHT: 68 IN | HEART RATE: 101 BPM

## 2017-11-21 DIAGNOSIS — Z47.89 ORTHOPEDIC AFTERCARE: Primary | ICD-10-CM

## 2017-11-21 DIAGNOSIS — Z78.9 PRESENCE OF SURGICAL INCISION: ICD-10-CM

## 2017-11-21 DIAGNOSIS — Z48.02 ENCOUNTER FOR REMOVAL OF SUTURES: ICD-10-CM

## 2017-11-21 PROCEDURE — 99024 POSTOP FOLLOW-UP VISIT: CPT | Performed by: PODIATRIST

## 2017-11-21 NOTE — PATIENT INSTRUCTIONS
Patient Discharge Instructions    CALL [phone] for questions regarding care. OFFICE HOURS ARE WED AND Thursday 8 A. M. TO 4:30 P. M. And subject to change without notice    Discharge instructions for the patient's plan of care. Left Foot  May soak in warm soapy water tomorrow 11/22 and then apply double tubigrip over area, then single tubigrip, leave steri-strips in place they will come off on there own. May start to alternate with crutches, 2 crutches through the weekend, then crutch on left side for 3-4 days then use crutch on right side. To remain off work until 12/20/17  Return to clinic 12/5/17 at 10:00AM  Next appointment:  Future Appointments  Date Time Provider Yang Skelton   12/20/2017 3:30 PM GORDON Reyse MED ONC ROOM 9 [facility] MED ONC [place]   2/14/2018 3:40 PM Zack Tate MD [place] [facility] [place]   3/21/2018 3:30 PM MD Carly Bustillo         Comments: We hope we gave you VERY GOOD care today!

## 2017-11-21 NOTE — PROGRESS NOTES
POD  # 13   POV # 2   Cc: \" no problems  -- no pain\"  HPI: Adán osteotomy Left foot ; staple fixation          3 pt NWB crutch gait x 2 weeks (-1 day)  ROS: -constitutional              -claudication , -DVT or calf pain , -angina   MRR:   Past Medical History:   Diagnosis Date    Anxiety     Bone spur     ON SPINE-BACK PAIN    Breast cancer ([facility] [address] 2014    LEFT     Depression     Essential hypertension     Osteoarthritis     Seasonal allergies     Wears dentures     FULL UPPER/PARTIAL LOWER/INSTRUCTED NOT TO USE ADHESIVE    Wrist fracture 2005     Allergies   Allergen Reactions    No Known Allergies        Current Outpatient Prescriptions:     traMADol (ULTRAM) 50 MG tablet, TAKE 1 TO 2 TABLETS BY MOUTH DAILY AS NEEDED, Disp: 60 tablet, Rfl: 0    Na Sulfate-K Sulfate-Mg Sulf (SUPREP BOWEL PREP KIT) 17.5-3.13-1.6 GM/180ML SOLN, Take as directed, Disp: 2 Bottle, Rfl: 0    DULoxetine (CYMBALTA) 20 MG extended release capsule, Take 1 capsule by mouth daily (Patient taking differently: Take 20 mg by mouth nightly ), Disp: 30 capsule, Rfl: 3    lisinopril (PRINIVIL;ZESTRIL) 5 MG tablet, TAKE 1 TABLET BY MOUTH DAILY, Disp: 60 tablet, Rfl: 3    Biotin 47899 MCG TBDP, Take by mouth daily, Disp: , Rfl:     ALPRAZolam (XANAX) 0.5 MG tablet, Take 1 tablet by mouth 2 times daily as needed for Anxiety, Disp: 60 tablet, Rfl: 5    exemestane (AROMASIN) 25 MG chemo tablet, TAKE 1 TABLET BY MOUTH DAILY, Disp: 90 tablet, Rfl: 3    prochlorperazine (COMPAZINE) 10 MG tablet, TAKE 1 TABLET BY MOUTH EVERY 6 HOURS AS NEEDED FOR NAUSEA, Disp: 60 tablet, Rfl: 1    b complex vitamins capsule, Take 1 capsule by mouth daily, Disp: , Rfl:     Dextromethorphan-Guaifenesin (MUCINEX DM)  MG TB12, Take 1 tablet by mouth daily as needed, Disp: , Rfl:     fexofenadine (ALLEGRA) 180 MG tablet, Take 180 mg by mouth daily , Disp: , Rfl:     Calcium 1500 MG TABS, Take by mouth daily, Disp: , Rfl:     Cholecalciferol (VITAMIN D3) 3000 UNITS TABS, Take by mouth daily, Disp: , Rfl:     aspirin 81 MG tablet, Take 81 mg by mouth daily. , Disp: , Rfl:     Past Surgical History:   Procedure Laterality Date    BREAST BIOPSY  2-18-14    Needle aspiration biopsy    BREAST RECONSTRUCTION  4-2015    Still in progress, initiated in April 2015   [facility] Bilateral     BREAST SURGERY Left 3/18/14    Dr.Wenger Tim Camacho    BUNIONECTOMY Left 11/08/2017    CARPAL TUNNEL RELEASE  2005    right    MASTECTOMY Left 03/18/2014    with sentinel node biopsy    OTHER SURGICAL HISTORY  approx 2005    Tendon surgery (Right hand)    OTHER SURGICAL HISTORY      PORT REMOVAL    TUNNELED VENOUS PORT PLACEMENT Right 4/30/14       PE: VSS, Afebrile, NAD, A&O x 3 , WD/WN, pleasant          LLE ; foot  -- bandage C, D, & I  (removed)                              Incision coapt with sutures ; no tension or drainage                               No edema , erythema or ecchymosis                              -calf pain                               Passive R.O.M. 1st MPJ -- 15-20 degrees w/o pain , rectus motion                               Photo comparison documentation   IMP: stable post op surgical site left foot , including incision          No s/s infection  / minimal inflammatory rxn   REC: suture removal  / steri placement             Begin contrast baths   Tx:     Layered compression dressing   P: continued transitional crutch gait ; 3-pt -to- PWB 1 crutch , next 10 days        RTC 2 weeks     [facility]  11/21/2017  9:42 AM

## 2017-11-21 NOTE — LETTER
[facility]  [address] [address]  Phone: [phone]  Fax: [phone]    Christopher Linton        November 21, 2017     Patient: Alexia Sandoval   YOB: 1955   Date of Visit: 11/21/2017       To Whom it May Concern:    Cezar Rocha was seen in my clinic on 11/21/2017. She is to remain off work until 12/20/17. Her next appointment in the clinic will be on 12/05/17    If you have any questions or concerns, please don't hesitate to call.     Sincerely,         Silvano Guido DPM

## 2017-11-27 RX ORDER — DULOXETIN HYDROCHLORIDE 20 MG/1
20 CAPSULE, DELAYED RELEASE ORAL DAILY
Qty: 30 CAPSULE | Refills: 5 | Status: SHIPPED | OUTPATIENT
Start: 2017-11-27 | End: 2018-03-09 | Stop reason: SDUPTHER

## 2017-12-04 ENCOUNTER — ANESTHESIA EVENT (OUTPATIENT)
Dept: OPERATING ROOM | Age: 62
End: 2017-12-04
Payer: COMMERCIAL

## 2017-12-04 ENCOUNTER — ANESTHESIA (OUTPATIENT)
Dept: OPERATING ROOM | Age: 62
End: 2017-12-04
Payer: COMMERCIAL

## 2017-12-04 ENCOUNTER — HOSPITAL ENCOUNTER (OUTPATIENT)
Age: 62
Setting detail: OUTPATIENT SURGERY
Discharge: HOME | End: 2017-12-04
Attending: INTERNAL MEDICINE | Admitting: INTERNAL MEDICINE
Payer: COMMERCIAL

## 2017-12-04 VITALS
DIASTOLIC BLOOD PRESSURE: 55 MMHG | RESPIRATION RATE: 13 BRPM | OXYGEN SATURATION: 97 % | SYSTOLIC BLOOD PRESSURE: 89 MMHG

## 2017-12-04 VITALS
BODY MASS INDEX: 20.92 KG/M2 | SYSTOLIC BLOOD PRESSURE: 139 MMHG | OXYGEN SATURATION: 98 % | HEART RATE: 71 BPM | HEIGHT: 68 IN | DIASTOLIC BLOOD PRESSURE: 85 MMHG | RESPIRATION RATE: 18 BRPM | WEIGHT: 138 LBS | TEMPERATURE: 97.3 F

## 2017-12-04 PROCEDURE — 2580000003 HC RX 258: Performed by: INTERNAL MEDICINE

## 2017-12-04 PROCEDURE — 7100000010 HC PHASE II RECOVERY - FIRST 15 MIN: Performed by: INTERNAL MEDICINE

## 2017-12-04 PROCEDURE — 7100000011 HC PHASE II RECOVERY - ADDTL 15 MIN: Performed by: INTERNAL MEDICINE

## 2017-12-04 PROCEDURE — 6360000002 HC RX W HCPCS: Performed by: NURSE ANESTHETIST, CERTIFIED REGISTERED

## 2017-12-04 PROCEDURE — 3609027000 HC COLONOSCOPY: Performed by: INTERNAL MEDICINE

## 2017-12-04 PROCEDURE — 2500000003 HC RX 250 WO HCPCS: Performed by: NURSE ANESTHETIST, CERTIFIED REGISTERED

## 2017-12-04 PROCEDURE — 3700000001 HC ADD 15 MINUTES (ANESTHESIA): Performed by: INTERNAL MEDICINE

## 2017-12-04 PROCEDURE — 2580000003 HC RX 258: Performed by: NURSE ANESTHETIST, CERTIFIED REGISTERED

## 2017-12-04 PROCEDURE — 45378 DIAGNOSTIC COLONOSCOPY: CPT | Performed by: INTERNAL MEDICINE

## 2017-12-04 PROCEDURE — 3700000000 HC ANESTHESIA ATTENDED CARE: Performed by: INTERNAL MEDICINE

## 2017-12-04 RX ORDER — SODIUM CHLORIDE, SODIUM LACTATE, POTASSIUM CHLORIDE, CALCIUM CHLORIDE 600; 310; 30; 20 MG/100ML; MG/100ML; MG/100ML; MG/100ML
INJECTION, SOLUTION INTRAVENOUS CONTINUOUS PRN
Status: DISCONTINUED | OUTPATIENT
Start: 2017-12-04 | End: 2017-12-04 | Stop reason: SDUPTHER

## 2017-12-04 RX ORDER — PROPOFOL 10 MG/ML
INJECTION, EMULSION INTRAVENOUS PRN
Status: DISCONTINUED | OUTPATIENT
Start: 2017-12-04 | End: 2017-12-04 | Stop reason: SDUPTHER

## 2017-12-04 RX ORDER — LIDOCAINE HYDROCHLORIDE 20 MG/ML
INJECTION, SOLUTION INFILTRATION; PERINEURAL PRN
Status: DISCONTINUED | OUTPATIENT
Start: 2017-12-04 | End: 2017-12-04 | Stop reason: SDUPTHER

## 2017-12-04 RX ORDER — SODIUM CHLORIDE, SODIUM LACTATE, POTASSIUM CHLORIDE, CALCIUM CHLORIDE 600; 310; 30; 20 MG/100ML; MG/100ML; MG/100ML; MG/100ML
INJECTION, SOLUTION INTRAVENOUS CONTINUOUS
Status: DISCONTINUED | OUTPATIENT
Start: 2017-12-04 | End: 2017-12-04 | Stop reason: HOSPADM

## 2017-12-04 RX ADMIN — SODIUM CHLORIDE, POTASSIUM CHLORIDE, SODIUM LACTATE AND CALCIUM CHLORIDE: 600; 310; 30; 20 INJECTION, SOLUTION INTRAVENOUS at 08:17

## 2017-12-04 RX ADMIN — PROPOFOL 50 MG: 10 INJECTION, EMULSION INTRAVENOUS at 08:50

## 2017-12-04 RX ADMIN — PROPOFOL 100 MG: 10 INJECTION, EMULSION INTRAVENOUS at 09:00

## 2017-12-04 RX ADMIN — SODIUM CHLORIDE, POTASSIUM CHLORIDE, SODIUM LACTATE AND CALCIUM CHLORIDE: 600; 310; 30; 20 INJECTION, SOLUTION INTRAVENOUS at 08:42

## 2017-12-04 RX ADMIN — SODIUM CHLORIDE, POTASSIUM CHLORIDE, SODIUM LACTATE AND CALCIUM CHLORIDE: 600; 310; 30; 20 INJECTION, SOLUTION INTRAVENOUS at 08:41

## 2017-12-04 RX ADMIN — LIDOCAINE HYDROCHLORIDE 60 MG: 20 INJECTION, SOLUTION INFILTRATION; PERINEURAL at 08:50

## 2017-12-04 RX ADMIN — PROPOFOL 50 MG: 10 INJECTION, EMULSION INTRAVENOUS at 08:55

## 2017-12-04 ASSESSMENT — PAIN SCALES - GENERAL
PAINLEVEL_OUTOF10: 0

## 2017-12-04 ASSESSMENT — LIFESTYLE VARIABLES: SMOKING_STATUS: 0

## 2017-12-04 ASSESSMENT — PAIN - FUNCTIONAL ASSESSMENT: PAIN_FUNCTIONAL_ASSESSMENT: 0-10

## 2017-12-04 NOTE — ANESTHESIA PRE PROCEDURE
Department of Anesthesiology  Preprocedure Note       Name:  Mckay Weston   Age:  63 y.o.  :  1955                                          MRN:  [id number]         Date:  2017      Surgeon: Autumn Wright):  Yvonne Potter MD    Procedure: Procedure(s):  COLONOSCOPY    Medications prior to admission:   Prior to Admission medications    Medication Sig Start Date End Date Taking? Authorizing Provider   traMADol (ULTRAM) 50 MG tablet TAKE 1-2 TABLETS BY MOUTH EVERY DAY AS NEEDED 17  Yes Yoko Puliod MD   DULoxetine (CYMBALTA) 20 MG extended release capsule Take 1 capsule by mouth daily 17  Yes Claudia Reyes MD   lisinopril (PRINIVIL;ZESTRIL) 5 MG tablet TAKE 1 TABLET BY MOUTH DAILY 17  Yes Yoko Pulido MD   Biotin 42519 MCG TBDP Take by mouth daily   Yes Historical Provider, MD   ALPRAZolam Barbara Thayer) 0.5 MG tablet Take 1 tablet by mouth 2 times daily as needed for Anxiety 17  Yes Brit Lopez MD   exemestane (AROMASIN) 25 MG chemo tablet TAKE 1 TABLET BY MOUTH DAILY 3/20/17  Yes Reilly Sherwood MD   prochlorperazine (COMPAZINE) 10 MG tablet TAKE 1 TABLET BY MOUTH EVERY 6 HOURS AS NEEDED FOR NAUSEA 16  Yes Claudia Reyes MD   b complex vitamins capsule Take 1 capsule by mouth daily   Yes Historical Provider, MD   fexofenadine (ALLEGRA) 180 MG tablet Take 180 mg by mouth daily    Yes Historical Provider, MD   Calcium 1500 MG TABS Take by mouth daily   Yes Historical Provider, MD   Cholecalciferol (VITAMIN D3) 3000 UNITS TABS Take by mouth daily   Yes Historical Provider, MD   aspirin 81 MG tablet Take 81 mg by mouth daily.    Yes Historical Provider, MD   Na Sulfate-K Sulfate-Mg Sulf (SUPREP BOWEL PREP KIT) 17.5-3.13-1.6 GM/180ML SOLN Take as directed 9/10/17   Yvonne Potter MD   Dextromethorphan-Guaifenesin [facility] DM)  MG TB12 Take 1 tablet by mouth daily as needed    Historical Provider, MD       Current medications:    Current Facility-Administered Medications   Medication Dose Route Frequency Provider Last Rate Last Dose    lactated ringers infusion   Intravenous Continuous Melissa Velazquez  mL/hr at 12/04/17 6927         Allergies:     Allergies   Allergen Reactions    No Known Allergies        Problem List:    Patient Active Problem List   Diagnosis Code    Abnormal mammogram R92.8    Breast cancer ([facility] [address] C50.919    S/P left mastectomy Z90.12    Osteoporosis M81.0    Neurop[facility] ([facility] [address] G62.9    H/O malignant neoplasm of breast Z85.3    Disproportion of reconstructed breast N65.1    Essential hypertension I10    Chronic back pain M54.9, G89.29    Colon cancer screening Z12.11    Chronic bilateral low back pain without sciatica M54.5, G89.29    Sinus infection J32.9    Neuropathy associated with cancer ([facility] [address] / breast cancer 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, G63    Hallux valgus (acquired), left foot M20.12    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       Past Medical History:        Diagnosis Date    Anxiety     Bone spur     ON SPINE-BACK PAIN    Breast cancer ([facility] [address] 2014    LEFT     Depression     Essential hypertension     Osteoarthritis     Seasonal allergies     Wears dentures     FULL UPPER/PARTIAL LOWER/INSTRUCTED NOT TO USE ADHESIVE    Wrist fracture 2005       Past Surgical History:        Procedure Laterality Date    BREAST BIOPSY  2-18-14    Needle aspiration biopsy    BREAST RECONSTRUCTION  4-2015    Still in progress, initiated in April 2015   [facility] Bilateral     BREAST SURGERY Left 3/18/14     - [facility]    BUNIONECTOMY Left 11/08/2017    BUNIONECTOMY Left 11/8/2017    FOOT BUNIONECTOMY-OSTEOTOMY, [facility] performed by Nati Weiner DPM at [facility]  2005    right    MASTECTOMY Left 03/18/2014    with sentinel node biopsy    OTHER SURGICAL HISTORY  approx 2005    Tendon surgery (Right hand)    OTHER SURGICAL HISTORY      PORT REMOVAL    TUNNELED VENOUS PORT PLACEMENT Right 4/30/14       Social History:    Social History   Substance Use Topics    Smoking status: Former Smoker     Packs/day: 0.50     Years: 10.00     Types: Cigarettes     Quit date: 3/18/2014    Smokeless tobacco: Never Used    Alcohol use Yes      Comment: Rarely                                Counseling given: Not Answered      Vital Signs (Current):   Vitals:    12/04/17 0808   BP: 116/75   Pulse: 108   Resp: 18   Temp: 36.4 °C (97.6 °F)   TempSrc: Temporal   SpO2: 94%   Weight: 138 lb (62.6 kg)   Height: 5' 8\" (1.727 m)                                              BP Readings from Last 3 Encounters:   12/04/17 116/75   11/21/17 135/81   11/14/17 119/78       NPO Status: Time of last liquid consumption: 0330                        Time of last solid consumption: 0800                        Date of last liquid consumption: 12/04/17                        Date of last solid food consumption: 12/03/17    BMI:   Wt Readings from Last 3 Encounters:   12/04/17 138 lb (62.6 kg)   11/14/17 138 lb (62.6 kg)   11/08/17 138 lb (62.6 kg)     Body mass index is 20.98 kg/m². CBC:   Lab Results   Component Value Date    WBC 10.0 09/07/2016    RBC 4.35 09/07/2016    HGB 13.6 09/07/2016    HCT 40.1 09/07/2016    MCV 92.2 09/07/2016    RDW 13.3 09/07/2016     09/07/2016       CMP:   Lab Results   Component Value Date     09/07/2016    K 4.5 09/07/2016     09/07/2016    CO2 28 09/07/2016    BUN 21 09/07/2016    CREATININE 0.76 09/07/2016    GFRAA >60 09/07/2016    LABGLOM >60 09/07/2016    GLUCOSE 89 09/07/2016    PROT 5.8 09/22/2014    CALCIUM 9.1 09/07/2016    BILITOT 0.17 09/22/2014    ALKPHOS 59 09/22/2014    AST 27 09/22/2014    ALT 43 09/22/2014       POC Tests: No results for input(s): POCGLU, POCNA, POCK, POCCL, POCBUN, POCHEMO, POCHCT in the last 72 hours.     Coags: No results found for: PROTIME, INR, APTT    HCG (If Applicable): No results found for: PREGTESTUR, PREGSERUM, HCG, HCGQUANT     ABGs: No results found for: PHART, PO2ART, WNU3DXS, OPV1YKQ, BEART, S2FYWBAX     Type & Screen (If Applicable):  No results found for: LABABO, [address]    Anesthesia Evaluation  Patient summary reviewed and Nursing notes reviewed no history of anesthetic complications:   Airway: Mallampati: II  TM distance: >3 FB   Neck ROM: full  Mouth opening: > = 3 FB Dental:    (+) upper dentures and lower dentures      Pulmonary:normal exam        (-) not a current smoker          Patient did not smoke on day of surgery. Cardiovascular:  Exercise tolerance: good (>4 METS),   (+) hypertension:,       ECG reviewed      Echocardiogram reviewed                  Neuro/Psych:   (+) psychiatric history: stable with treatment            GI/Hepatic/Renal:        (-) GERD and bowel prep       Endo/Other:    (+) malignancy/cancer (breast). Abdominal:           Vascular: negative vascular ROS. Anesthesia Plan      general     ASA 2       Induction: intravenous. Anesthetic plan and risks discussed with patient. Plan discussed with CRNA.                   Lary Leong CRNA   12/4/2017

## 2017-12-04 NOTE — OP NOTE
PROCEDURE NOTE    DATE OF PROCEDURE: 12/4/2017    ENDOSCOPIST: Naga Ibarra MD, [facility]    ASSISTANT: None    PREOPERATIVE DIAGNOSIS: screening for colon cancer    POSTOPERATIVE DIAGNOSIS: diverticulosis,  Mild in degree, involving the descending colon and the sigmoid  hemorrhoids internal, Small in size    OPERATION: Colonoscopy --screening    ANESTHESIA: MAC     ESTIMATED BLOOD LOSS: No    COMPLICATIONS: None. SPECIMENS: were not obtained    HISTORY: The patient is a 63y.o. year old female with history of above preop diagnosis. Colonoscopy with possible biopsy or polypectomy has been recommended and I explained the risk, benefits, expected outcome, and alternatives to the procedure. Risks include but are not limited to bleeding, infection, respiratory distress, hypotension, and perforation of the colon. The patient understands and is in agreement. PROCEDURE: The patient was given monitored anesthesia care. The patient was given oxygen by nasal cannula. The colonoscope was inserted per rectum and advanced under direct vision to the cecum without difficulty. Findings:  Cecum/Ascending colon: normal    Transverse colon: normal    Descending/Sigmoid colon: abnormal: diverticulosis    Rectum/Anus: examined in normal and retroflexed positions and was abnormal: hemorrhoids    The colon was decompressed and the scope was removed. The patient tolerated the procedure well. Current guidelines call for a repeat colonoscopy in 10 years.       Electronically signed by Emelia Romberg, MD  on 12/4/2017 at 9:16 AM

## 2017-12-04 NOTE — ANESTHESIA POSTPROCEDURE EVALUATION
Department of Anesthesiology  Postprocedure Note    Patient: Army Sanchez  MRN: [id number]  YOB: 1955  Date of evaluation: 12/4/2017  Time:  0910     Procedure Summary     Date:  12/04/17 [facility] / [facility]    Anesthesia Start:  8127 Anesthesia Stop:  1485    Procedure:  COLONOSCOPY (N/A ) Diagnosis:  (COLON CANCER SCREENING, PERSONAL HX OF CANCER)    Surgeon:  Joe Brower MD Responsible Provider: John Denis CRNA    Anesthesia Type:  general ASA Status:  2          Anesthesia Type: general    Leann Phase I:      Leann Phase II:      Last vitals: Reviewed and per EMR flowsheets.        Anesthesia Post Evaluation    Patient location during evaluation: PACU  Patient participation: complete - patient participated  Level of consciousness: awake and alert  Airway patency: patent  Nausea & Vomiting: no nausea and no vomiting  Complications: no  Cardiovascular status: blood pressure returned to baseline  Respiratory status: acceptable and room air  Hydration status: euvolemic

## 2017-12-04 NOTE — H&P
Comment: Rarely    Drug use: No    Sexual activity: Not on file     Other Topics Concern    Not on file     Social History Narrative    No narrative on file     ROS: Non-contributory    Physical Exam:  Vitals:    12/04/17 0808   BP: 116/75   Pulse: 108   Resp: 18   Temp: 97.6 °F (36.4 °C)   SpO2: 94%       Chest: Breath sounds were clear and equal with no rales, wheezes, or rhonchi. Respiratory effort was normal with no retractions or use of accessory muscles. Cardiovascular: Heart sounds were normal with a regular rate and rhythm. There were no murmurs, gallops or rubs. Abdomen: Bowel sounds were normal.  The abdomen was soft and non distended. There was no tenderness, guarding, rebound, or rigidity. There were no masses, hepatosplenomegaly, or hernias.     Plan: Colonoscopy      Electronically by Gina Owens MD  on 12/4/2017 at 8:40 AM

## 2017-12-05 ENCOUNTER — HOSPITAL ENCOUNTER (OUTPATIENT)
Age: 62
Discharge: HOME | End: 2017-12-05
Payer: COMMERCIAL

## 2017-12-05 ENCOUNTER — OFFICE VISIT (OUTPATIENT)
Dept: PODIATRY | Age: 62
End: 2017-12-05

## 2017-12-05 ENCOUNTER — HOSPITAL ENCOUNTER (OUTPATIENT)
Dept: GENERAL RADIOLOGY | Age: 62
Discharge: HOME | End: 2017-12-05
Payer: COMMERCIAL

## 2017-12-05 VITALS
TEMPERATURE: 97.4 F | SYSTOLIC BLOOD PRESSURE: 138 MMHG | HEIGHT: 68 IN | HEART RATE: 103 BPM | DIASTOLIC BLOOD PRESSURE: 62 MMHG | RESPIRATION RATE: 18 BRPM

## 2017-12-05 DIAGNOSIS — Z47.89 ORTHOPEDIC AFTERCARE: ICD-10-CM

## 2017-12-05 DIAGNOSIS — Z47.89 ORTHOPEDIC AFTERCARE: Primary | ICD-10-CM

## 2017-12-05 DIAGNOSIS — Z11.59 NEED FOR HEPATITIS C SCREENING TEST: ICD-10-CM

## 2017-12-05 LAB — HEPATITIS C ANTIBODY: NONREACTIVE

## 2017-12-05 PROCEDURE — 99024 POSTOP FOLLOW-UP VISIT: CPT | Performed by: PODIATRIST

## 2017-12-05 PROCEDURE — 86803 HEPATITIS C AB TEST: CPT

## 2017-12-05 PROCEDURE — 73630 X-RAY EXAM OF FOOT: CPT

## 2017-12-05 PROCEDURE — 36415 COLL VENOUS BLD VENIPUNCTURE: CPT

## 2017-12-05 NOTE — PATIENT INSTRUCTIONS
Patient Discharge Instructions    CALL [phone] for questions regarding care. OFFICE HOURS ARE WED AND Thursday 8 A. M. TO 4:30 P. M. And subject to change without notice    Discharge instructions for the patient's plan of care. Left foot xray today  Apply betadine to area the next 3 days after shower, and then apply gauze and tubigrip. Return to clinic Weds. 12/27/17 at 10:00AM  Next appointment:  Future Appointments  Date Time Provider Yang Skelton   12/20/2017 3:30 PM [facility] [place]   2/14/2018 3:40 PM Carroll Palomares MD [place] [facility]   3/21/2018 3:30 PM MD Keke Jordan         Comments: We hope we gave you VERY GOOD care today!

## 2017-12-11 RX ORDER — ALPRAZOLAM 0.5 MG/1
0.5 TABLET ORAL 2 TIMES DAILY PRN
Qty: 60 TABLET | Refills: 5 | Status: SHIPPED | OUTPATIENT
Start: 2017-12-11 | End: 2018-06-28 | Stop reason: SDUPTHER

## 2017-12-20 ENCOUNTER — HOSPITAL ENCOUNTER (OUTPATIENT)
Dept: INFUSION THERAPY | Age: 62
Discharge: HOME | End: 2017-12-20
Payer: COMMERCIAL

## 2017-12-20 VITALS
DIASTOLIC BLOOD PRESSURE: 72 MMHG | TEMPERATURE: 97.8 F | SYSTOLIC BLOOD PRESSURE: 141 MMHG | HEART RATE: 90 BPM | RESPIRATION RATE: 20 BRPM

## 2017-12-20 DIAGNOSIS — M81.0 AGE-RELATED OSTEOPOROSIS WITHOUT CURRENT PATHOLOGICAL FRACTURE: ICD-10-CM

## 2017-12-20 PROCEDURE — 96401 CHEMO ANTI-NEOPL SQ/IM: CPT

## 2017-12-20 PROCEDURE — 6360000002 HC RX W HCPCS

## 2017-12-20 RX ORDER — EPINEPHRINE 1 MG/ML
0.3 INJECTION, SOLUTION, CONCENTRATE INTRAVENOUS PRN
Status: CANCELLED | OUTPATIENT
Start: 2017-12-20

## 2017-12-20 RX ORDER — DIPHENHYDRAMINE HYDROCHLORIDE 50 MG/ML
50 INJECTION INTRAMUSCULAR; INTRAVENOUS ONCE
Status: CANCELLED | OUTPATIENT
Start: 2017-12-20 | End: 2017-12-20

## 2017-12-20 RX ORDER — SODIUM CHLORIDE 9 MG/ML
100 INJECTION, SOLUTION INTRAVENOUS CONTINUOUS
Status: CANCELLED | OUTPATIENT
Start: 2017-12-20

## 2017-12-20 RX ORDER — METHYLPREDNISOLONE SODIUM SUCCINATE 125 MG/2ML
125 INJECTION, POWDER, LYOPHILIZED, FOR SOLUTION INTRAMUSCULAR; INTRAVENOUS ONCE
Status: CANCELLED | OUTPATIENT
Start: 2017-12-20 | End: 2017-12-20

## 2017-12-20 RX ORDER — 0.9 % SODIUM CHLORIDE 0.9 %
10 VIAL (ML) INJECTION ONCE
Status: CANCELLED | OUTPATIENT
Start: 2017-12-20 | End: 2017-12-20

## 2017-12-20 RX ADMIN — DENOSUMAB 60 MG: 60 INJECTION SUBCUTANEOUS at 13:05

## 2017-12-27 ENCOUNTER — HOSPITAL ENCOUNTER (OUTPATIENT)
Age: 62
Discharge: HOME | End: 2017-12-27
Payer: COMMERCIAL

## 2017-12-27 ENCOUNTER — OFFICE VISIT (OUTPATIENT)
Dept: PODIATRY | Age: 62
End: 2017-12-27

## 2017-12-27 ENCOUNTER — HOSPITAL ENCOUNTER (OUTPATIENT)
Dept: GENERAL RADIOLOGY | Age: 62
Discharge: HOME | End: 2017-12-27
Payer: COMMERCIAL

## 2017-12-27 VITALS
DIASTOLIC BLOOD PRESSURE: 80 MMHG | HEART RATE: 110 BPM | RESPIRATION RATE: 18 BRPM | SYSTOLIC BLOOD PRESSURE: 120 MMHG | HEIGHT: 68 IN | TEMPERATURE: 99.1 F

## 2017-12-27 DIAGNOSIS — Z47.89 ORTHOPEDIC AFTERCARE: Primary | ICD-10-CM

## 2017-12-27 DIAGNOSIS — Z47.89 ORTHOPEDIC AFTERCARE: ICD-10-CM

## 2017-12-27 PROCEDURE — 73630 X-RAY EXAM OF FOOT: CPT

## 2017-12-27 PROCEDURE — 99024 POSTOP FOLLOW-UP VISIT: CPT | Performed by: PODIATRIST

## 2018-03-09 ENCOUNTER — OFFICE VISIT (OUTPATIENT)
Dept: ONCOLOGY | Age: 63
End: 2018-03-09
Payer: COMMERCIAL

## 2018-03-09 VITALS
RESPIRATION RATE: 16 BRPM | HEIGHT: 68 IN | BODY MASS INDEX: 20.16 KG/M2 | TEMPERATURE: 97.4 F | WEIGHT: 133 LBS | SYSTOLIC BLOOD PRESSURE: 133 MMHG | HEART RATE: 85 BPM | DIASTOLIC BLOOD PRESSURE: 89 MMHG

## 2018-03-09 DIAGNOSIS — Z17.0 MALIGNANT NEOPLASM OF LEFT BREAST IN FEMALE, ESTROGEN RECEPTOR POSITIVE, UNSPECIFIED SITE OF BREAST (HCC): Primary | ICD-10-CM

## 2018-03-09 DIAGNOSIS — M81.0 AGE-RELATED OSTEOPOROSIS WITHOUT CURRENT PATHOLOGICAL FRACTURE: ICD-10-CM

## 2018-03-09 DIAGNOSIS — C50.912 MALIGNANT NEOPLASM OF LEFT BREAST IN FEMALE, ESTROGEN RECEPTOR POSITIVE, UNSPECIFIED SITE OF BREAST (HCC): Primary | ICD-10-CM

## 2018-03-09 PROCEDURE — 99214 OFFICE O/P EST MOD 30 MIN: CPT | Performed by: INTERNAL MEDICINE

## 2018-03-09 RX ORDER — TRAMADOL HYDROCHLORIDE 50 MG/1
50 TABLET ORAL 2 TIMES DAILY
COMMUNITY
End: 2018-04-04 | Stop reason: SDUPTHER

## 2018-03-09 RX ORDER — EXEMESTANE 25 MG/1
TABLET ORAL
Qty: 90 TABLET | Refills: 3 | Status: SHIPPED | OUTPATIENT
Start: 2018-03-09 | End: 2019-01-04 | Stop reason: SDUPTHER

## 2018-03-09 RX ORDER — DULOXETIN HYDROCHLORIDE 30 MG/1
30 CAPSULE, DELAYED RELEASE ORAL DAILY
Qty: 30 CAPSULE | Refills: 5 | Status: SHIPPED | OUTPATIENT
Start: 2018-03-09 | End: 2018-11-20 | Stop reason: SDUPTHER

## 2018-06-04 ENCOUNTER — TELEPHONE (OUTPATIENT)
Dept: ONCOLOGY | Age: 63
End: 2018-06-04

## 2018-06-20 ENCOUNTER — HOSPITAL ENCOUNTER (OUTPATIENT)
Dept: INFUSION THERAPY | Age: 63
Discharge: HOME | End: 2018-06-20
Payer: COMMERCIAL

## 2018-06-20 VITALS
HEART RATE: 81 BPM | DIASTOLIC BLOOD PRESSURE: 77 MMHG | RESPIRATION RATE: 18 BRPM | TEMPERATURE: 98 F | SYSTOLIC BLOOD PRESSURE: 128 MMHG

## 2018-06-20 DIAGNOSIS — M81.0 AGE-RELATED OSTEOPOROSIS WITHOUT CURRENT PATHOLOGICAL FRACTURE: ICD-10-CM

## 2018-06-20 PROCEDURE — 96401 CHEMO ANTI-NEOPL SQ/IM: CPT

## 2018-06-20 PROCEDURE — 6360000002 HC RX W HCPCS: Performed by: INTERNAL MEDICINE

## 2018-06-20 RX ORDER — SODIUM CHLORIDE 9 MG/ML
100 INJECTION, SOLUTION INTRAVENOUS CONTINUOUS
Status: CANCELLED | OUTPATIENT
Start: 2018-06-20

## 2018-06-20 RX ORDER — METHYLPREDNISOLONE SODIUM SUCCINATE 125 MG/2ML
125 INJECTION, POWDER, LYOPHILIZED, FOR SOLUTION INTRAMUSCULAR; INTRAVENOUS ONCE
Status: CANCELLED | OUTPATIENT
Start: 2018-06-20 | End: 2018-06-20

## 2018-06-20 RX ORDER — 0.9 % SODIUM CHLORIDE 0.9 %
10 VIAL (ML) INJECTION ONCE
Status: CANCELLED | OUTPATIENT
Start: 2018-06-20 | End: 2018-06-20

## 2018-06-20 RX ORDER — DIPHENHYDRAMINE HYDROCHLORIDE 50 MG/ML
50 INJECTION INTRAMUSCULAR; INTRAVENOUS ONCE
Status: CANCELLED | OUTPATIENT
Start: 2018-06-20 | End: 2018-06-20

## 2018-06-20 RX ADMIN — DENOSUMAB 60 MG: 60 INJECTION SUBCUTANEOUS at 15:32

## 2018-06-28 DIAGNOSIS — Z85.3 H/O MALIGNANT NEOPLASM OF BREAST: Primary | ICD-10-CM

## 2018-06-28 RX ORDER — ALPRAZOLAM 0.5 MG/1
0.5 TABLET ORAL 2 TIMES DAILY PRN
Qty: 60 TABLET | Refills: 5 | Status: SHIPPED | OUTPATIENT
Start: 2018-06-28 | End: 2018-12-27 | Stop reason: SDUPTHER

## 2018-10-01 ENCOUNTER — HOSPITAL ENCOUNTER (OUTPATIENT)
Dept: WOMENS IMAGING | Age: 63
Discharge: HOME | End: 2018-10-03
Payer: COMMERCIAL

## 2018-10-01 DIAGNOSIS — C50.912 MALIGNANT NEOPLASM OF LEFT BREAST IN FEMALE, ESTROGEN RECEPTOR POSITIVE, UNSPECIFIED SITE OF BREAST (HCC): ICD-10-CM

## 2018-10-01 DIAGNOSIS — Z17.0 MALIGNANT NEOPLASM OF LEFT BREAST IN FEMALE, ESTROGEN RECEPTOR POSITIVE, UNSPECIFIED SITE OF BREAST (HCC): ICD-10-CM

## 2018-10-01 DIAGNOSIS — M81.0 AGE-RELATED OSTEOPOROSIS WITHOUT CURRENT PATHOLOGICAL FRACTURE: ICD-10-CM

## 2018-10-01 PROCEDURE — 77067 SCR MAMMO BI INCL CAD: CPT

## 2018-10-17 ENCOUNTER — OFFICE VISIT (OUTPATIENT)
Dept: ONCOLOGY | Age: 63
End: 2018-10-17
Payer: COMMERCIAL

## 2018-10-17 VITALS
HEART RATE: 78 BPM | DIASTOLIC BLOOD PRESSURE: 72 MMHG | TEMPERATURE: 97.9 F | RESPIRATION RATE: 18 BRPM | WEIGHT: 137 LBS | BODY MASS INDEX: 20.76 KG/M2 | SYSTOLIC BLOOD PRESSURE: 125 MMHG | HEIGHT: 68 IN

## 2018-10-17 DIAGNOSIS — M81.0 AGE-RELATED OSTEOPOROSIS WITHOUT CURRENT PATHOLOGICAL FRACTURE: ICD-10-CM

## 2018-10-17 DIAGNOSIS — C80.1 NEUROPATHY ASSOCIATED WITH CANCER (HCC): ICD-10-CM

## 2018-10-17 DIAGNOSIS — C50.912 MALIGNANT NEOPLASM OF LEFT BREAST IN FEMALE, ESTROGEN RECEPTOR POSITIVE, UNSPECIFIED SITE OF BREAST (HCC): Primary | ICD-10-CM

## 2018-10-17 DIAGNOSIS — Z17.0 MALIGNANT NEOPLASM OF LEFT BREAST IN FEMALE, ESTROGEN RECEPTOR POSITIVE, UNSPECIFIED SITE OF BREAST (HCC): Primary | ICD-10-CM

## 2018-10-17 DIAGNOSIS — G63 NEUROPATHY ASSOCIATED WITH CANCER (HCC): ICD-10-CM

## 2018-10-17 PROCEDURE — 99214 OFFICE O/P EST MOD 30 MIN: CPT | Performed by: INTERNAL MEDICINE

## 2018-10-17 RX ORDER — GABAPENTIN 300 MG/1
300 CAPSULE ORAL DAILY
COMMUNITY
End: 2018-11-14 | Stop reason: ALTCHOICE

## 2018-10-17 NOTE — PROGRESS NOTES
history (approx 2005); Mastectomy (Left, 03/18/2014); Tunneled venous port placement (Right, 4/30/14); Breast surgery (Left, 3/18/14); Breast biopsy (2-18-14); Breast reconstruction ([id number]); other surgical history; Breast reconstruction (Bilateral); Bunionectomy (Left, 11/08/2017); Bunionectomy (Left, 11/8/2017); pr colon ca scrn not hi rsk ind (N/A, 12/4/2017); and Colonoscopy (12/04/2017). Home Medications  has a current medication list which includes the following prescription(s): gabapentin, lisinopril, alprazolam, duloxetine, exemestane, biotin, prochlorperazine, b complex vitamins, mucinex dm, fexofenadine, calcium, vitamin d3, and aspirin. Allergies:  No known allergies        Review of Systems :      Constitutional: No fever or chills. No night sweats, mild fatigue  HEENT: negative for sore mouth, sore throat, hoarseness and voice change r  Respiratory: negative for cough , sputum, dyspnea, wheezing, hemoptysis, chest pain   Cardiovascular: negative for chest pain, dyspnea, palpitations, orthopnea, PND   Gastrointestinal: negative for nausea, vomiting, diarrhea, constipation, abdominal pain,   Genitourinary: negative for frequency, dysuria, nocturia, urinary incontinence, and hematuria   Hematologic/Lymphatic: negative for easy bruising, bleeding, lymphadenopathy, petechiae and swelling/edema   Endocrine: negative for heat or cold intolerance, tremor, weight changes, change in bowel habits and hair loss   Musculoskeletal: Mild aches and pains since he started hormone therapy  Neurological: Neuropathy.       Physical Examination :       /72 (Site: Right Upper Arm, Position: Sitting, Cuff Size: Medium Adult)   Pulse 78   Temp 97.9 °F (36.6 °C) (Temporal)   Resp 18   Ht 5' 8\" (1.727 m)   Wt 137 lb (62.1 kg)   BMI 20.83 kg/m²     Performance Status:Estimated performance status is ECOG 0    General appearance - well appearing, no in pain or distress ,alopec[place]   Mental status - alert and cooperative   Neck - supple, right submandibular swelling about 1 cm, softer and less tender than before  Mouth exam: No masses could be seen, no ulcers   Lymphatics - no palpable lymphadenopathy, no hepatosplenomegaly   Chest - clear to auscultation, no wheezes, rales or rhonchi, symmetric air entry   Left side mastectomy s/p reconstruction. No axillary LN enlargement. Right breast exam: No masses felt and no axillary adenopathy. S/p breast reduction surgery. Heart - normal rate, regular rhythm, normal S1, S2, no murmurs,  Abdomen - soft, nontender, nondistended, no masses or organomegaly   Neurological - alert, oriented, normal speech, no focal findings or movement disorder noted   Musculoskeletal - no joint tenderness, deformity or swelling   Extremities - peripheral pulses normal, no pedal edema, no clubbing or cyanosis   Skin - normal coloration and turgor, no rashes, no suspicious skin lesions noted        Mammogram   July 5, 2016 right mammogram is negative. Assessment:    1. Left side stage I breast cancer, ER positive/NH positive/HER-2 positive, status post left mastectomy. She finished 4 cycles of dose dense AC. She received 12 weeks of weekly Taxol and Herceptin concluded on Sep 23, 2014. She was given 15 cycles of single agent Herceptin that concluded June 23, 2015 and also is taking Aromasin. No evidence of local recurrence or new primary according to the mammography. 2. 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 Followed in pain clinic. 3. Severe osteoporosis with T score of -3.1, patient take vitamin D and calcium and also will continue Prolia  60 mg every 6 months. 4. On aromatase inhibitor       Plan:     1. Doing well with no evidence of recurrence of cancer. Continue right mammography annually. Last mammogram was October 1, 2018. It is negative. All explained to the patient. 2. Continue Aromasin. Tolerated well.   3. Continue Prolia for

## 2018-11-14 ENCOUNTER — HOSPITAL ENCOUNTER (OUTPATIENT)
Age: 63
Setting detail: SPECIMEN
Discharge: HOME | End: 2018-11-14
Payer: COMMERCIAL

## 2018-11-14 PROCEDURE — G0145 SCR C/V CYTO,THINLAYER,RESCR: HCPCS

## 2018-11-20 ENCOUNTER — TELEPHONE (OUTPATIENT)
Dept: ONCOLOGY | Age: 63
End: 2018-11-20

## 2018-11-20 RX ORDER — DULOXETIN HYDROCHLORIDE 30 MG/1
30 CAPSULE, DELAYED RELEASE ORAL DAILY
Qty: 30 CAPSULE | Refills: 5 | Status: SHIPPED | OUTPATIENT
Start: 2018-11-20 | End: 2019-08-13 | Stop reason: SDUPTHER

## 2018-11-20 NOTE — TELEPHONE ENCOUNTER
Fax received from [facility] for refill of Cymbalta. Patient is taking to improve tolerance of hot flashes. Continues on Aromison. Refill sent.

## 2018-12-01 LAB — CYTOLOGY REPORT: NORMAL

## 2018-12-20 ENCOUNTER — HOSPITAL ENCOUNTER (OUTPATIENT)
Dept: INFUSION THERAPY | Age: 63
Discharge: HOME | End: 2018-12-20
Payer: COMMERCIAL

## 2018-12-20 VITALS
TEMPERATURE: 97.3 F | DIASTOLIC BLOOD PRESSURE: 76 MMHG | HEART RATE: 83 BPM | RESPIRATION RATE: 18 BRPM | SYSTOLIC BLOOD PRESSURE: 140 MMHG

## 2018-12-20 DIAGNOSIS — M81.0 AGE-RELATED OSTEOPOROSIS WITHOUT CURRENT PATHOLOGICAL FRACTURE: ICD-10-CM

## 2018-12-20 PROCEDURE — 6360000002 HC RX W HCPCS: Performed by: INTERNAL MEDICINE

## 2018-12-20 PROCEDURE — 96401 CHEMO ANTI-NEOPL SQ/IM: CPT

## 2018-12-20 RX ORDER — SODIUM CHLORIDE 9 MG/ML
100 INJECTION, SOLUTION INTRAVENOUS CONTINUOUS
Status: CANCELLED | OUTPATIENT
Start: 2018-12-20

## 2018-12-20 RX ORDER — DIPHENHYDRAMINE HYDROCHLORIDE 50 MG/ML
50 INJECTION INTRAMUSCULAR; INTRAVENOUS ONCE
Status: CANCELLED | OUTPATIENT
Start: 2018-12-20 | End: 2018-12-20

## 2018-12-20 RX ORDER — METHYLPREDNISOLONE SODIUM SUCCINATE 125 MG/2ML
125 INJECTION, POWDER, LYOPHILIZED, FOR SOLUTION INTRAMUSCULAR; INTRAVENOUS ONCE
Status: CANCELLED | OUTPATIENT
Start: 2018-12-20 | End: 2018-12-20

## 2018-12-20 RX ORDER — 0.9 % SODIUM CHLORIDE 0.9 %
10 VIAL (ML) INJECTION ONCE
Status: CANCELLED | OUTPATIENT
Start: 2018-12-20 | End: 2018-12-20

## 2018-12-20 RX ADMIN — DENOSUMAB 60 MG: 60 INJECTION SUBCUTANEOUS at 15:18

## 2018-12-27 DIAGNOSIS — Z85.3 H/O MALIGNANT NEOPLASM OF BREAST: ICD-10-CM

## 2019-01-02 RX ORDER — ALPRAZOLAM 0.5 MG/1
0.5 TABLET ORAL 2 TIMES DAILY PRN
Qty: 60 TABLET | Refills: 5 | Status: SHIPPED | OUTPATIENT
Start: 2019-01-02 | End: 2019-07-23 | Stop reason: SDUPTHER

## 2019-01-04 RX ORDER — EXEMESTANE 25 MG/1
TABLET ORAL
Qty: 90 TABLET | Refills: 1 | Status: SHIPPED | OUTPATIENT
Start: 2019-01-04 | End: 2019-08-06

## 2019-03-14 ENCOUNTER — HOSPITAL ENCOUNTER (OUTPATIENT)
Dept: BONE DENSITY | Age: 64
Discharge: HOME | End: 2019-03-16
Payer: COMMERCIAL

## 2019-03-14 DIAGNOSIS — M81.0 AGE-RELATED OSTEOPOROSIS WITHOUT CURRENT PATHOLOGICAL FRACTURE: ICD-10-CM

## 2019-03-14 DIAGNOSIS — Z17.0 MALIGNANT NEOPLASM OF LEFT BREAST IN FEMALE, ESTROGEN RECEPTOR POSITIVE, UNSPECIFIED SITE OF BREAST (HCC): ICD-10-CM

## 2019-03-14 DIAGNOSIS — C50.912 MALIGNANT NEOPLASM OF LEFT BREAST IN FEMALE, ESTROGEN RECEPTOR POSITIVE, UNSPECIFIED SITE OF BREAST (HCC): ICD-10-CM

## 2019-03-14 PROCEDURE — 77080 DXA BONE DENSITY AXIAL: CPT

## 2019-03-18 NOTE — ANESTHESIA PRE PROCEDURE
Parrish Hunt daily as needed    Historical Provider, MD       Current medications:    Current Facility-Administered Medications   Medication Dose Route Frequency Provider Last Rate Last Dose    lactated ringers infusion   Intravenous Continuous Kerwin Lucina,  mL/hr at 11/08/17 0640      sodium chloride flush 0.9 % injection 10 mL  10 mL Intravenous 2 times per day Kerwin Lucina, DPM        sodium chloride flush 0.9 % injection 10 mL  10 mL Intravenous PRN Kerwin Lucina, DPM        ceFAZolin (ANCEF) 2 g in dextrose 3 % 50 mL IVPB (duplex)  2 g Intravenous On Call to [address] DPM           Allergies:     Allergies   Allergen Reactions    No Known Allergies        Problem List:    Patient Active Problem List   Diagnosis Code    Abnormal mammogram R92.8    [facility] C50.919    S/P left mastectomy Z90.12    Osteoporosis M81.0    Neurop[facility] ([facility] G62.9    H/O malignant neoplasm of breast Z85.3    Disproportion of reconstructed breast N65.1    Essential hypertension I10    Chronic back pain M54.9, G89.29    Colon cancer screening Z12.11    Chronic bilateral low back pain without sciatica M54.5, G89.29    Sinus infection J32.9    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 cancer 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, G63    Hallux valgus (acquired), left foot M20.12    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       Past Medical History:        Diagnosis Date    Anxiety     Bone spur     ON SPINE-BACK PAIN    Breast cancer ([facility] 2014    LEFT     Depression     Essential hypertension     Osteoarthritis     Seasonal allergies     Wears dentures     FULL UPPER/PARTIAL LOWER/INSTRUCTED NOT TO USE ADHESIVE    Wrist fracture 2005       Past Surgical History:        Procedure Laterality Date    BREAST BIOPSY  2-18-14    Needle aspiration biopsy    BREAST RECONSTRUCTION  4-2015    Still in progress, initiated in April 2015    BREAST RECONSTRUCTION Bilateral     BREAST SURGERY Left 3/18/14  [facility]   [address]  2005    right    MASTECTOMY Left 03/18/2014    with sentinel node biopsy    OTHER SURGICAL HISTORY  approx 2005    Tendon surgery (Right hand)    OTHER SURGICAL HISTORY      PORT REMOVAL    TUNNELED VENOUS PORT PLACEMENT Right 4/30/14       Social History:    Social History   Substance Use Topics    Smoking status: Former Smoker     Packs/day: 0.50     Years: 10.00     Types: Cigarettes     Quit date: 3/18/2014    Smokeless tobacco: Never Used    Alcohol use Yes      Comment: Rarely                                Counseling given: Not Answered      Vital Signs (Current):   Vitals:    11/08/17 0615   BP: 116/79   Pulse: 79   Resp: 20   Temp: 36.1 °C (97 °F)   TempSrc: Temporal   SpO2: 95%   Weight: 138 lb (62.6 kg)   Height: 5' 8\" (1.727 m)                                              BP Readings from Last 3 Encounters:   11/08/17 116/79   09/26/17 128/78   09/20/17 134/70       NPO Status: Time of last liquid consumption: 2030                        Time of last solid consumption: 2030                        Date of last liquid consumption: 11/07/17                        Date of last solid food consumption: 11/07/17    BMI:   Wt Readings from Last 3 Encounters:   11/08/17 138 lb (62.6 kg)   09/20/17 141 lb (64 kg)   09/01/17 140 lb (63.5 kg)     Body mass index is 20.98 kg/m².     CBC:   Lab Results   Component Value Date    WBC 10.0 09/07/2016    RBC 4.35 09/07/2016    HGB 13.6 09/07/2016    HCT 40.1 09/07/2016    MCV 92.2 09/07/2016    RDW 13.3 09/07/2016     09/07/2016       CMP:   Lab Results   Component Value Date     09/07/2016    K 4.5 09/07/2016     09/07/2016    CO2 28 09/07/2016    BUN 21 09/07/2016    CREATININE 0.76 09/07/2016    GFRAA >60 09/07/2016    LABGLOM >60 09/07/2016    GLUCOSE 89 09/07/2016    PROT 5.8 09/22/2014    CALCIUM 9.1 09/07/2016    BILITOT 0.17 09/22/2014    ALKPHOS 59 09/22/2014    AST 27 09/22/2014    ALT 43 Amilcar Roldan MD [address] [place] [address]   3/22/2019 10:45 AM Jaylen Lewis, PTA [facility] 09/22/2014       POC Tests: No results for input(s): POCGLU, POCNA, POCK, POCCL, POCBUN, POCHEMO, POCHCT in the last 72 hours. Coags: No results found for: PROTIME, INR, APTT    HCG (If Applicable): No results found for: PREGTESTUR, PREGSERUM, HCG, HCGQUANT     ABGs: No results found for: PHART, PO2ART, PIF5GIL, MZQ1KBF, BEART, A4ZNDIHX     Type & Screen (If Applicable):  No results found for: LABABO, [address]    Anesthesia Evaluation  Patient summary reviewed and Nursing notes reviewed no history of anesthetic complications:   Airway: Mallampati: II  TM distance: >3 FB   Neck ROM: full  Mouth opening: > = 3 FB Dental:    (+) upper dentures and partials      Pulmonary:negative ROS and normal exam  breath sounds clear to auscultation                             Cardiovascular:    (+) hypertension: mild,         Rhythm: regular  Rate: normal                    Neuro/Psych:   (+) psychiatric history: stable with treatment            GI/Hepatic/Renal: neg ROS            Endo/Other: negative ROS   (+) : arthritis: OA. Abdominal:       Abdomen: soft. Vascular:                                      Anesthesia Plan      general     ASA 3       Induction: intravenous. MIPS: Prophylactic antiemetics administered. Anesthetic plan and risks discussed with patient and mother. Plan discussed with CRNA.                   Trevon Romo CRNA   11/8/2017

## 2019-05-08 ENCOUNTER — OFFICE VISIT (OUTPATIENT)
Dept: ONCOLOGY | Age: 64
End: 2019-05-08
Payer: COMMERCIAL

## 2019-05-08 VITALS
WEIGHT: 132.2 LBS | TEMPERATURE: 97.8 F | SYSTOLIC BLOOD PRESSURE: 114 MMHG | BODY MASS INDEX: 20.75 KG/M2 | HEIGHT: 67 IN | DIASTOLIC BLOOD PRESSURE: 74 MMHG | HEART RATE: 84 BPM

## 2019-05-08 DIAGNOSIS — C50.912 MALIGNANT NEOPLASM OF LEFT BREAST IN FEMALE, ESTROGEN RECEPTOR POSITIVE, UNSPECIFIED SITE OF BREAST (HCC): ICD-10-CM

## 2019-05-08 DIAGNOSIS — M81.0 AGE-RELATED OSTEOPOROSIS WITHOUT CURRENT PATHOLOGICAL FRACTURE: ICD-10-CM

## 2019-05-08 DIAGNOSIS — Z85.3 H/O MALIGNANT NEOPLASM OF BREAST: Primary | ICD-10-CM

## 2019-05-08 DIAGNOSIS — R53.83 FATIGUE, UNSPECIFIED TYPE: ICD-10-CM

## 2019-05-08 DIAGNOSIS — Z17.0 MALIGNANT NEOPLASM OF LEFT BREAST IN FEMALE, ESTROGEN RECEPTOR POSITIVE, UNSPECIFIED SITE OF BREAST (HCC): ICD-10-CM

## 2019-05-08 PROCEDURE — 99214 OFFICE O/P EST MOD 30 MIN: CPT | Performed by: INTERNAL MEDICINE

## 2019-05-08 RX ORDER — PROCHLORPERAZINE MALEATE 10 MG
TABLET ORAL
Qty: 60 TABLET | Refills: 1 | Status: SHIPPED | OUTPATIENT
Start: 2019-05-08 | End: 2022-07-19

## 2019-05-08 NOTE — PROGRESS NOTES
Reason for the visit:   Chief Complaint   Patient presents with    Follow-up     discuss treatment    Medication Refill     needs refill on her compazine       Pertinent Clinical Problems/ Treatments:    1. Left sided breast cancer,[id number], ER positive/AZ positive/HER-2 positive,  2. S/p left mastectomy , pt choice,  S/p Dose dense AC x 4  3. Pt started weekly herceptin /taxol on 7/8/14,  week 12 done 9/23/14. Maintenance Herceptin every 3 weeks , completed one year of therapy. 4. Osteoporosis with T score of -3.1, we'll continue vitamin D/calcium and Prolia   5. On adjuvant hormone therapy, initially started on Arimidex then switched to Aromasin ( tolerating well)     Summary of the case/HPI :  She is a 77-year-old patient who has had no major comorbidities was diagnosed to have left sided breast cancer. It was ER positive/AZ positive/HER-2 positive, she elected to undergo left mastectomy. patient subsequently started on adjuvant chemotherapy. She received 4 doses of dose dense AC. She has handled  chemotherapy well. She completed one year of herceptin. And after chemotherapy she was maintained of aromatse inhibitor. She developed osteoporosis and was started on prolia     Interim history:  Patient seen for follow-up breast cancer. Doing well clinically. She is maintained on Aromasin. She is very tired. She can not get out of bed. She wants to know if the Aromasin is contributing to her fatigue. While she is able to maintain work but it is getting harder to maintain a full shift. She is also having intermittent nausea and wanted a refill on Compazine. Past Medical History   has a past medical history of Anxiety, Bone spur, Breast cancer ([address] [facility] [address] Depression, Essential hypertension, Osteoarthritis, Seasonal allergies, Wears dentures, and Wrist fracture.     Surgical History   has a past surgical history that includes Carpal tunnel release (2005); other surgical history (approx 2005); submandibular swelling about 1 cm, softer and less tender than before  Mouth exam: No masses could be seen, no ulcers   Lymphatics - no palpable lymphadenopathy, no hepatosplenomegaly   Chest - clear to auscultation, no wheezes, rales or rhonchi, symmetric air entry   Left side mastectomy s/p reconstruction. No axillary LN enlargement. Right breast exam: No masses felt and no axillary adenopathy. S/p breast reduction surgery.   Heart - normal rate, regular rhythm, normal S1, S2, no murmurs,  Abdomen - soft, nontender, nondistended, no masses or organomegaly   Neurological - alert, oriented, normal speech, no focal findings or movement disorder noted   Musculoskeletal - no joint tenderness, deformity or swelling   Extremities - peripheral pulses normal, no pedal edema, no clubbing or cyanosis   Skin - normal coloration and turgor, no rashes, no suspicious skin lesions noted        Mammogram 10/18  Impression   No mammographic evidence of malignancy right breast.  Advise annual screening   mammography.       BREAST DENSITY SUMMARY C: The breasts are heterogeneously dense which may   obscure small masses.       Your mammogram demonstrates that you have dense breast tissue, which could   hide abnormalities.  Dense breast tissue in and of itself is a relatively   common condition.  Therefore, this information is not provided to cause undue   concern, rather, it is to raise your awareness and promote discussion with   your health care provider regarding the presence of dense breast tissue in   addition to other risk factors.       BIRADS:   BIRADS - CATEGORY 1       Negative, no evidence of malignancy in the right breast.       OVERALL ASSESSMENT - NEGATIVE     Bone density   FINDINGS:   LUMBAR SPINE:       The bone mineral density in the lumbar spine including the L1-L4 levels is   measured at 0.902 g/cm2, which corresponds to a T-score of -2.3 and a Z-score   of -0.9.  This is within the osteopenia range by [facility] criteria.       This indicates 0.9% increase from the previous study. On the previous study   the L1-L4 BMD value was: 0.894 g/cm2 and the total T-score was: -2.4.       LEFT HIP:       The bone mineral density in the total hip is measured at 0.752 g/cm2   corresponding to a T-score of -2.0 and a Z-score of -0.9.  This is within the   osteopenia range by [facility] criteria.       The bone mineral density of the femoral neck is measured at 0.636 g/cm2   corresponding to a T-score of -2.9 and a Z-score of -1.5.  This is within the   osteoporosis range by WHO criteria.       RIGHT HIP:       The bone mineral density in the total hip is measured at 0.765 g/cm2   corresponding to a T-score of -1.9 and a Z-score of -0.8.  This is within the   osteopenic range by WHO criteria.       The bone mineral density of the femoral neck is measured at 0.635 g/cm2   corresponding to a T-score of -2.9 and a Z-score of -1.5.  This is within the   osteoporotic range by [facility] criteria.       The total mean bone mineral density in the hips is measured at 0.759 g/cm2   corresponding to a T-score of -2.0 and a Z-score of -0.9.  This is within the   osteopenic range by WHO criteria.       This indicates 4.0% increase from the previous study. On the previous study   the total BMD value was: 0.730 g/cm2 and the total T-score was: -2.2.       FRAX 10 year probability of fracture:       - major osteoporotic fracture: 8.9%       - hip fracture: 1.3%                 Assessment:    1. Left side stage I breast cancer, ER positive/ID positive/HER-2 positive, status post left mastectomy. She finished 4 cycles of dose dense AC. She received 12 weeks of weekly Taxol and Herceptin concluded on Sep 23, 2014. She was given 15 cycles of single agent Herceptin that concluded June 23, 2015 and also is taking Aromasin. No evidence of local recurrence or new primary according to the mammography.   2. 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

## 2019-06-20 ENCOUNTER — HOSPITAL ENCOUNTER (OUTPATIENT)
Dept: INFUSION THERAPY | Age: 64
Discharge: HOME | End: 2019-06-20
Payer: COMMERCIAL

## 2019-06-20 VITALS — TEMPERATURE: 97.8 F | RESPIRATION RATE: 18 BRPM | HEART RATE: 81 BPM

## 2019-06-20 DIAGNOSIS — M81.0 AGE-RELATED OSTEOPOROSIS WITHOUT CURRENT PATHOLOGICAL FRACTURE: Primary | ICD-10-CM

## 2019-06-20 PROCEDURE — 6360000002 HC RX W HCPCS: Performed by: INTERNAL MEDICINE

## 2019-06-20 PROCEDURE — 96401 CHEMO ANTI-NEOPL SQ/IM: CPT

## 2019-06-20 RX ORDER — DIPHENHYDRAMINE HYDROCHLORIDE 50 MG/ML
50 INJECTION INTRAMUSCULAR; INTRAVENOUS ONCE
Status: CANCELLED | OUTPATIENT
Start: 2019-12-17

## 2019-06-20 RX ORDER — METHYLPREDNISOLONE SODIUM SUCCINATE 125 MG/2ML
125 INJECTION, POWDER, LYOPHILIZED, FOR SOLUTION INTRAMUSCULAR; INTRAVENOUS ONCE
Status: CANCELLED | OUTPATIENT
Start: 2019-12-17

## 2019-06-20 RX ORDER — SODIUM CHLORIDE 9 MG/ML
100 INJECTION, SOLUTION INTRAVENOUS CONTINUOUS
Status: CANCELLED | OUTPATIENT
Start: 2019-12-17

## 2019-06-20 RX ORDER — 0.9 % SODIUM CHLORIDE 0.9 %
10 VIAL (ML) INJECTION ONCE
Status: CANCELLED | OUTPATIENT
Start: 2019-12-17

## 2019-06-20 RX ADMIN — DENOSUMAB 60 MG: 60 INJECTION SUBCUTANEOUS at 15:23

## 2019-06-26 ENCOUNTER — OFFICE VISIT (OUTPATIENT)
Dept: ONCOLOGY | Age: 64
End: 2019-06-26
Payer: COMMERCIAL

## 2019-06-26 VITALS
RESPIRATION RATE: 18 BRPM | SYSTOLIC BLOOD PRESSURE: 111 MMHG | BODY MASS INDEX: 21.35 KG/M2 | DIASTOLIC BLOOD PRESSURE: 67 MMHG | HEIGHT: 67 IN | HEART RATE: 88 BPM | TEMPERATURE: 98.6 F | WEIGHT: 136 LBS

## 2019-06-26 DIAGNOSIS — C80.1 NEUROPATHY ASSOCIATED WITH CANCER (HCC): ICD-10-CM

## 2019-06-26 DIAGNOSIS — C50.912 MALIGNANT NEOPLASM OF LEFT BREAST IN FEMALE, ESTROGEN RECEPTOR POSITIVE, UNSPECIFIED SITE OF BREAST (HCC): Primary | ICD-10-CM

## 2019-06-26 DIAGNOSIS — Z17.0 MALIGNANT NEOPLASM OF LEFT BREAST IN FEMALE, ESTROGEN RECEPTOR POSITIVE, UNSPECIFIED SITE OF BREAST (HCC): Primary | ICD-10-CM

## 2019-06-26 DIAGNOSIS — R53.83 FATIGUE, UNSPECIFIED TYPE: ICD-10-CM

## 2019-06-26 DIAGNOSIS — G63 NEUROPATHY ASSOCIATED WITH CANCER (HCC): ICD-10-CM

## 2019-06-26 PROCEDURE — 99214 OFFICE O/P EST MOD 30 MIN: CPT | Performed by: INTERNAL MEDICINE

## 2019-06-26 RX ORDER — TRAMADOL HYDROCHLORIDE 50 MG/1
50 TABLET ORAL
COMMUNITY
Start: 2015-09-14 | End: 2019-07-15 | Stop reason: SDUPTHER

## 2019-06-26 NOTE — PROGRESS NOTES
Reason for the visit:   Chief Complaint   Patient presents with    Follow-up     evaluate how she feels off of Al    Breast Cancer       Pertinent Clinical Problems/ Treatments:    1. Left sided breast cancer,[id number], ER positive/NH positive/HER-2 positive,  2. S/p left mastectomy , pt choice,  S/p Dose dense AC x 4  3. Pt started weekly herceptin /taxol on 7/8/14,  week 12 done 9/23/14. Maintenance Herceptin every 3 weeks , completed one year of therapy. 4. Osteoporosis with T score of -3.1, we'll continue vitamin D/calcium and Prolia   5. On adjuvant hormone therapy, initially started on Arimidex then switched to Aromasin discontinued May 2019 due to increasing side effects with fatigue. Summary of the case/HPI :  She is a 37-year-old patient who has had no major comorbidities was diagnosed to have left sided breast cancer. It was ER positive/NH positive/HER-2 positive, she elected to undergo left mastectomy. patient subsequently started on adjuvant chemotherapy. She received 4 doses of dose dense AC. She has handled  chemotherapy well. She completed one year of herceptin. And after chemotherapy she was maintained of aromatse inhibitor. She developed osteoporosis and was started on prolia     Interim history:  Patient seen for follow-up breast cancer. Doing well clinically. She was maintained on Aromasin. She is very tired. Treatment was held for the last 4 to 6 weeks. She has slight improvement. She continues to have significant fatigue. She feels sleepy. She sleeps about 14 hours every night. She can not get out of bed. While she is able to maintain work but it is getting harder to maintain a full shift. Past Medical History   has a past medical history of Anxiety, Bone spur, Breast cancer ([address] Depression, Essential hypertension, Osteoarthritis, Seasonal allergies, Wears dentures, and Wrist fracture.     Surgical History   has a past surgical history that includes Carpal tunnel release (2005); other surgical history (approx 2005); Mastectomy (Left, 03/18/2014); Tunneled venous port placement (Right, 4/30/14); Breast surgery (Left, 3/18/14); Breast biopsy (2-18-14); Breast reconstruction (0-1939); other surgical history; Breast reconstruction (Bilateral); Bunionectomy (Left, 11/08/2017); Bunionectomy (Left, 11/8/2017); pr colon ca scrn not hi rsk ind (N/A, 12/4/2017); and Colonoscopy (12/04/2017). Home Medications  has a current medication list which includes the following prescription(s): tramadol, prochlorperazine, alprazolam, duloxetine, lisinopril, biotin, b complex vitamins, mucinex dm, fexofenadine, calcium, vitamin d3, aspirin, and exemestane. Allergies:  No known allergies        Review of Systems :      Constitutional: No fever or chills. No night sweats, progressive fatigue  HEENT: negative for sore mouth, sore throat, hoarseness and voice change   Respiratory: negative for cough , sputum, dyspnea, wheezing, hemoptysis, chest pain   Cardiovascular: negative for chest pain, dyspnea, palpitations, orthopnea, PND   Gastrointestinal: Mild intermittent nausea, no vomiting, diarrhea, constipation, abdominal pain,   Genitourinary: negative for frequency, dysuria, nocturia, urinary incontinence, and hematuria   Hematologic/Lymphatic: negative for easy bruising, bleeding, lymphadenopathy, petechiae and swelling/edema   Endocrine: negative for heat or cold intolerance, tremor, weight changes, change in bowel habits and hair loss   Musculoskeletal: Mild aches and pains since he started hormone therapy  Neurological: Neuropathy.       Physical Examination :       /67 (Site: Right Upper Arm, Position: Sitting, Cuff Size: Medium Adult)   Pulse 88   Temp 98.6 °F (37 °C) (Temporal)   Resp 18   Ht 5' 7\" (1.702 m)   Wt 136 lb (61.7 kg)   BMI 21.30 kg/m²     Performance Status:Estimated performance status is ECOG 0    General appearance - well appearing, no in pain or distress ,[place]   Mental status - alert and cooperative   Neck - supple, right submandibular swelling about 1 cm, softer and less tender than before  Mouth exam: No masses could be seen, no ulcers   Lymphatics - no palpable lymphadenopathy, no hepatosplenomegaly   Chest - clear to auscultation, no wheezes, rales or rhonchi, symmetric air entry   Left side mastectomy s/p reconstruction. No axillary LN enlargement. Right breast exam: No masses felt and no axillary adenopathy. S/p breast reduction surgery.   Heart - normal rate, regular rhythm, normal S1, S2, no murmurs,  Abdomen - soft, nontender, nondistended, no masses or organomegaly   Neurological - alert, oriented, normal speech, no focal findings or movement disorder noted   Musculoskeletal - no joint tenderness, deformity or swelling   Extremities - peripheral pulses normal, no pedal edema, no clubbing or cyanosis   Skin - normal coloration and turgor, no rashes, no suspicious skin lesions noted        Mammogram 10/18  Impression   No mammographic evidence of malignancy right breast.  Advise annual screening   mammography.       BREAST DENSITY SUMMARY C: The breasts are heterogeneously dense which may   obscure small masses.       Your mammogram demonstrates that you have dense breast tissue, which could   hide abnormalities.  Dense breast tissue in and of itself is a relatively   common condition.  Therefore, this information is not provided to cause undue   concern, rather, it is to raise your awareness and promote discussion with   your health care provider regarding the presence of dense breast tissue in   addition to other risk factors.       BIRADS:   BIRADS - CATEGORY 1       Negative, no evidence of malignancy in the right breast.       OVERALL ASSESSMENT - NEGATIVE     Bone density   FINDINGS:   LUMBAR SPINE:       The bone mineral density in the lumbar spine including the L1-L4 levels is   measured at 0.902 g/cm2, which corresponds to a T-score of -2.3 and a Z-score   of -0.9.  This is within the osteopenia range by WHO criteria.       This indicates 0.9% increase from the previous study. On the previous study   the L1-L4 BMD value was: 0.894 g/cm2 and the total T-score was: -2.4.       LEFT HIP:       The bone mineral density in the total hip is measured at 0.752 g/cm2   corresponding to a T-score of -2.0 and a Z-score of -0.9.  This is within the   osteopenia range by [facility] criteria.       The bone mineral density of the femoral neck is measured at 0.636 g/cm2   corresponding to a T-score of -2.9 and a Z-score of -1.5.  This is within the   osteoporosis range by WHO criteria.       RIGHT HIP:       The bone mineral density in the total hip is measured at 0.765 g/cm2   corresponding to a T-score of -1.9 and a Z-score of -0.8.  This is within the   osteopenic range by WHO criteria.       The bone mineral density of the femoral neck is measured at 0.635 g/cm2   corresponding to a T-score of -2.9 and a Z-score of -1.5.  This is within the   osteoporotic range by [facility] criteria.       The total mean bone mineral density in the hips is measured at 0.759 g/cm2   corresponding to a T-score of -2.0 and a Z-score of -0.9.  This is within the   osteopenic range by WHO criteria.       This indicates 4.0% increase from the previous study. On the previous study   the total BMD value was: 0.730 g/cm2 and the total T-score was: -2.2.       FRAX 10 year probability of fracture:       - major osteoporotic fracture: 8.9%       - hip fracture: 1.3%                 Assessment:    1. Left side stage I breast cancer, ER positive/SD positive/HER-2 positive, status post left mastectomy. She finished 4 cycles of dose dense AC. She received 12 weeks of weekly Taxol and Herceptin concluded on Sep 23, 2014. She was given 15 cycles of single agent Herceptin that concluded June 23, 2015 and also is taking Aromasin. Discontinued May 2019.   No evidence of local recurrence or new primary

## 2019-07-05 ENCOUNTER — HOSPITAL ENCOUNTER (OUTPATIENT)
Dept: LAB | Age: 64
Discharge: HOME | End: 2019-07-05
Payer: COMMERCIAL

## 2019-07-05 DIAGNOSIS — C80.1 NEUROPATHY ASSOCIATED WITH CANCER (HCC): ICD-10-CM

## 2019-07-05 DIAGNOSIS — R53.83 FATIGUE, UNSPECIFIED TYPE: ICD-10-CM

## 2019-07-05 DIAGNOSIS — G63 NEUROPATHY ASSOCIATED WITH CANCER (HCC): ICD-10-CM

## 2019-07-05 DIAGNOSIS — C50.912 MALIGNANT NEOPLASM OF LEFT BREAST IN FEMALE, ESTROGEN RECEPTOR POSITIVE, UNSPECIFIED SITE OF BREAST (HCC): ICD-10-CM

## 2019-07-05 DIAGNOSIS — Z17.0 MALIGNANT NEOPLASM OF LEFT BREAST IN FEMALE, ESTROGEN RECEPTOR POSITIVE, UNSPECIFIED SITE OF BREAST (HCC): ICD-10-CM

## 2019-07-05 LAB
ABSOLUTE EOS #: 0.27 K/UL (ref 0–0.44)
ABSOLUTE IMMATURE GRANULOCYTE: 0.03 K/UL (ref 0–0.3)
ABSOLUTE LYMPH #: 2.03 K/UL (ref 1.1–3.7)
ABSOLUTE MONO #: 0.35 K/UL (ref 0.1–1.2)
ALBUMIN SERPL-MCNC: 4.6 G/DL (ref 3.5–5.2)
ALBUMIN/GLOBULIN RATIO: 1.7 (ref 1–2.5)
ALP BLD-CCNC: 53 U/L (ref 35–104)
ALT SERPL-CCNC: 20 U/L (ref 5–33)
ANION GAP SERPL CALCULATED.3IONS-SCNC: 11 MMOL/L (ref 9–17)
AST SERPL-CCNC: 19 U/L
BASOPHILS # BLD: 0 % (ref 0–2)
BASOPHILS ABSOLUTE: <0.03 K/UL (ref 0–0.2)
BILIRUB SERPL-MCNC: 0.32 MG/DL (ref 0.3–1.2)
BUN BLDV-MCNC: 12 MG/DL (ref 8–23)
BUN/CREAT BLD: 15 (ref 9–20)
CALCIUM SERPL-MCNC: 9.7 MG/DL (ref 8.6–10.4)
CHLORIDE BLD-SCNC: 104 MMOL/L (ref 98–107)
CO2: 27 MMOL/L (ref 20–31)
CREAT SERPL-MCNC: 0.8 MG/DL (ref 0.5–0.9)
DIFFERENTIAL TYPE: NORMAL
EOSINOPHILS RELATIVE PERCENT: 4 % (ref 1–4)
FERRITIN: 80 UG/L (ref 13–150)
FOLATE: 13.5 NG/ML
GFR AFRICAN AMERICAN: >60 ML/MIN
GFR NON-AFRICAN AMERICAN: >60 ML/MIN
GFR SERPL CREATININE-BSD FRML MDRD: ABNORMAL ML/MIN/{1.73_M2}
GFR SERPL CREATININE-BSD FRML MDRD: ABNORMAL ML/MIN/{1.73_M2}
GLUCOSE BLD-MCNC: 124 MG/DL (ref 70–99)
HCT VFR BLD CALC: 44.7 % (ref 36.3–47.1)
HEMOGLOBIN: 14.6 G/DL (ref 11.9–15.1)
IMMATURE GRANULOCYTES: 0 %
IRON SATURATION: 31 % (ref 20–55)
IRON: 119 UG/DL (ref 37–145)
LYMPHOCYTES # BLD: 29 % (ref 24–43)
MCH RBC QN AUTO: 31 PG (ref 25.2–33.5)
MCHC RBC AUTO-ENTMCNC: 32.7 G/DL (ref 28.4–34.8)
MCV RBC AUTO: 94.9 FL (ref 82.6–102.9)
MONOCYTES # BLD: 5 % (ref 3–12)
NRBC AUTOMATED: 0 PER 100 WBC
PDW BLD-RTO: 12.3 % (ref 11.8–14.4)
PLATELET # BLD: 203 K/UL (ref 138–453)
PLATELET ESTIMATE: NORMAL
PMV BLD AUTO: 10.3 FL (ref 8.1–13.5)
POTASSIUM SERPL-SCNC: 4.7 MMOL/L (ref 3.7–5.3)
RBC # BLD: 4.71 M/UL (ref 3.95–5.11)
RBC # BLD: NORMAL 10*6/UL
SEG NEUTROPHILS: 62 % (ref 36–65)
SEGMENTED NEUTROPHILS ABSOLUTE COUNT: 4.28 K/UL (ref 1.5–8.1)
SODIUM BLD-SCNC: 142 MMOL/L (ref 135–144)
TOTAL IRON BINDING CAPACITY: 387 UG/DL (ref 250–450)
TOTAL PROTEIN: 7.3 G/DL (ref 6.4–8.3)
TSH SERPL DL<=0.05 MIU/L-ACNC: 0.65 MIU/L (ref 0.3–5)
UNSATURATED IRON BINDING CAPACITY: 268 UG/DL (ref 112–347)
VITAMIN B-12: 1114 PG/ML (ref 232–1245)
WBC # BLD: 7 K/UL (ref 3.5–11.3)
WBC # BLD: NORMAL 10*3/UL

## 2019-07-05 PROCEDURE — 84443 ASSAY THYROID STIM HORMONE: CPT

## 2019-07-05 PROCEDURE — 82607 VITAMIN B-12: CPT

## 2019-07-05 PROCEDURE — 80053 COMPREHEN METABOLIC PANEL: CPT

## 2019-07-05 PROCEDURE — 36415 COLL VENOUS BLD VENIPUNCTURE: CPT

## 2019-07-05 PROCEDURE — 83540 ASSAY OF IRON: CPT

## 2019-07-05 PROCEDURE — 83550 IRON BINDING TEST: CPT

## 2019-07-05 PROCEDURE — 82728 ASSAY OF FERRITIN: CPT

## 2019-07-05 PROCEDURE — 85025 COMPLETE CBC W/AUTO DIFF WBC: CPT

## 2019-07-05 PROCEDURE — 82746 ASSAY OF FOLIC ACID SERUM: CPT

## 2019-07-23 DIAGNOSIS — Z85.3 H/O MALIGNANT NEOPLASM OF BREAST: ICD-10-CM

## 2019-07-24 RX ORDER — ALPRAZOLAM 0.5 MG/1
0.5 TABLET ORAL 2 TIMES DAILY PRN
Qty: 60 TABLET | Refills: 5 | Status: SHIPPED | OUTPATIENT
Start: 2019-07-24 | End: 2019-08-26 | Stop reason: SDUPTHER

## 2019-08-06 PROBLEM — R06.83 LOUD SNORING: Status: ACTIVE | Noted: 2019-08-06

## 2019-08-13 RX ORDER — DULOXETIN HYDROCHLORIDE 30 MG/1
30 CAPSULE, DELAYED RELEASE ORAL DAILY
Qty: 30 CAPSULE | Refills: 5 | Status: SHIPPED | OUTPATIENT
Start: 2019-08-13 | End: 2020-06-23 | Stop reason: SDUPTHER

## 2019-10-25 ENCOUNTER — HOSPITAL ENCOUNTER (OUTPATIENT)
Dept: SLEEP CENTER | Age: 64
Discharge: HOME | End: 2019-10-25
Payer: COMMERCIAL

## 2019-10-25 DIAGNOSIS — G47.33 OBSTRUCTIVE SLEEP APNEA SYNDROME: ICD-10-CM

## 2019-10-25 PROCEDURE — 95810 POLYSOM 6/> YRS 4/> PARAM: CPT

## 2019-10-25 ASSESSMENT — SLEEP AND FATIGUE QUESTIONNAIRES
HOW LIKELY ARE YOU TO NOD OFF OR FALL ASLEEP WHILE SITTING QUIETLY AFTER LUNCH WITHOUT ALCOHOL: 1
HOW LIKELY ARE YOU TO NOD OFF OR FALL ASLEEP WHEN YOU ARE A PASSENGER IN A CAR FOR AN HOUR WITHOUT A BREAK: 2
HOW LIKELY ARE YOU TO NOD OFF OR FALL ASLEEP WHILE SITTING AND READING: 0
HOW LIKELY ARE YOU TO NOD OFF OR FALL ASLEEP WHILE LYING DOWN TO REST IN THE AFTERNOON WHEN CIRCUMSTANCES PERMIT: 3
NECK CIRCUMFERENCE (INCHES): 12.5
HOW LIKELY ARE YOU TO NOD OFF OR FALL ASLEEP WHILE WATCHING TV: 1
ESS TOTAL SCORE: 12
HOW LIKELY ARE YOU TO NOD OFF OR FALL ASLEEP WHILE SITTING INACTIVE IN A PUBLIC PLACE: 2
HOW LIKELY ARE YOU TO NOD OFF OR FALL ASLEEP WHILE SITTING AND TALKING TO SOMEONE: 0
HOW LIKELY ARE YOU TO NOD OFF OR FALL ASLEEP IN A CAR, WHILE STOPPED FOR A FEW MINUTES IN TRAFFIC: 3

## 2019-11-04 LAB — STATUS: NORMAL

## 2019-11-08 ENCOUNTER — HOSPITAL ENCOUNTER (OUTPATIENT)
Dept: WOMENS IMAGING | Age: 64
Discharge: HOME | End: 2019-11-10
Payer: COMMERCIAL

## 2019-11-08 DIAGNOSIS — Z12.39 BREAST CANCER SCREENING: ICD-10-CM

## 2019-11-08 PROCEDURE — 77063 BREAST TOMOSYNTHESIS BI: CPT

## 2019-12-12 ENCOUNTER — OFFICE VISIT (OUTPATIENT)
Dept: ENT CLINIC | Age: 64
End: 2019-12-12
Payer: COMMERCIAL

## 2019-12-12 VITALS
TEMPERATURE: 98.5 F | SYSTOLIC BLOOD PRESSURE: 150 MMHG | RESPIRATION RATE: 16 BRPM | BODY MASS INDEX: 21.38 KG/M2 | HEIGHT: 68 IN | WEIGHT: 141.1 LBS | HEART RATE: 89 BPM | DIASTOLIC BLOOD PRESSURE: 90 MMHG

## 2019-12-12 DIAGNOSIS — R06.83 SNORING: Primary | ICD-10-CM

## 2019-12-12 DIAGNOSIS — L98.9 LESION OF FACE: ICD-10-CM

## 2019-12-12 DIAGNOSIS — J34.2 DNS (DEVIATED NASAL SEPTUM): ICD-10-CM

## 2019-12-12 DIAGNOSIS — J30.9 ALLERGIC RHINITIS, UNSPECIFIED SEASONALITY, UNSPECIFIED TRIGGER: ICD-10-CM

## 2019-12-12 DIAGNOSIS — R09.81 NASAL CONGESTION: ICD-10-CM

## 2019-12-12 PROCEDURE — 99203 OFFICE O/P NEW LOW 30 MIN: CPT | Performed by: PHYSICIAN ASSISTANT

## 2019-12-12 RX ORDER — ALPRAZOLAM 0.5 MG/1
0.5 TABLET ORAL NIGHTLY PRN
COMMUNITY
Start: 2019-12-01 | End: 2020-05-11

## 2019-12-12 RX ORDER — FLUTICASONE PROPIONATE 50 MCG
2 SPRAY, SUSPENSION (ML) NASAL DAILY
Qty: 1 BOTTLE | Refills: 2 | Status: SHIPPED | OUTPATIENT
Start: 2019-12-12

## 2019-12-12 ASSESSMENT — ENCOUNTER SYMPTOMS
SORE THROAT: 0
ABDOMINAL DISTENTION: 0
VOICE CHANGE: 1
STRIDOR: 0
FACIAL SWELLING: 0
CONSTIPATION: 0
ABDOMINAL PAIN: 0
BLOOD IN STOOL: 0
RECTAL PAIN: 0
COLOR CHANGE: 0
RHINORRHEA: 1
ANAL BLEEDING: 0
SINUS PAIN: 1
APNEA: 0
COUGH: 1
BACK PAIN: 1
TROUBLE SWALLOWING: 1
EYE REDNESS: 0
CHEST TIGHTNESS: 0
SHORTNESS OF BREATH: 1
EYE PAIN: 0
PHOTOPHOBIA: 0
VOMITING: 0
EYE DISCHARGE: 0
CHOKING: 0
DIARRHEA: 0
WHEEZING: 0
EYE ITCHING: 0
NAUSEA: 0
SINUS PRESSURE: 1

## 2019-12-20 ENCOUNTER — OFFICE VISIT (OUTPATIENT)
Dept: ONCOLOGY | Age: 64
End: 2019-12-20
Payer: COMMERCIAL

## 2019-12-20 ENCOUNTER — HOSPITAL ENCOUNTER (OUTPATIENT)
Dept: INFUSION THERAPY | Age: 64
Discharge: HOME | End: 2019-12-20
Payer: COMMERCIAL

## 2019-12-20 VITALS
WEIGHT: 144 LBS | RESPIRATION RATE: 16 BRPM | DIASTOLIC BLOOD PRESSURE: 63 MMHG | HEIGHT: 68 IN | BODY MASS INDEX: 21.82 KG/M2 | SYSTOLIC BLOOD PRESSURE: 131 MMHG | TEMPERATURE: 97.8 F | HEART RATE: 78 BPM

## 2019-12-20 VITALS
TEMPERATURE: 97.8 F | BODY MASS INDEX: 22.22 KG/M2 | DIASTOLIC BLOOD PRESSURE: 63 MMHG | RESPIRATION RATE: 16 BRPM | HEART RATE: 78 BPM | SYSTOLIC BLOOD PRESSURE: 131 MMHG | WEIGHT: 144 LBS

## 2019-12-20 DIAGNOSIS — C50.912 MALIGNANT NEOPLASM OF LEFT BREAST IN FEMALE, ESTROGEN RECEPTOR POSITIVE, UNSPECIFIED SITE OF BREAST (HCC): Primary | ICD-10-CM

## 2019-12-20 DIAGNOSIS — M81.0 AGE-RELATED OSTEOPOROSIS WITHOUT CURRENT PATHOLOGICAL FRACTURE: ICD-10-CM

## 2019-12-20 DIAGNOSIS — M81.0 AGE-RELATED OSTEOPOROSIS WITHOUT CURRENT PATHOLOGICAL FRACTURE: Primary | ICD-10-CM

## 2019-12-20 DIAGNOSIS — Z17.0 MALIGNANT NEOPLASM OF LEFT BREAST IN FEMALE, ESTROGEN RECEPTOR POSITIVE, UNSPECIFIED SITE OF BREAST (HCC): Primary | ICD-10-CM

## 2019-12-20 DIAGNOSIS — C80.1 NEUROPATHY ASSOCIATED WITH CANCER (HCC): ICD-10-CM

## 2019-12-20 DIAGNOSIS — G63 NEUROPATHY ASSOCIATED WITH CANCER (HCC): ICD-10-CM

## 2019-12-20 PROCEDURE — 99214 OFFICE O/P EST MOD 30 MIN: CPT | Performed by: INTERNAL MEDICINE

## 2019-12-20 PROCEDURE — 96401 CHEMO ANTI-NEOPL SQ/IM: CPT

## 2019-12-20 PROCEDURE — 6360000002 HC RX W HCPCS: Performed by: INTERNAL MEDICINE

## 2019-12-20 RX ORDER — DIPHENHYDRAMINE HYDROCHLORIDE 50 MG/ML
50 INJECTION INTRAMUSCULAR; INTRAVENOUS ONCE
Status: CANCELLED | OUTPATIENT
Start: 2020-06-14

## 2019-12-20 RX ORDER — SODIUM CHLORIDE 9 MG/ML
100 INJECTION, SOLUTION INTRAVENOUS CONTINUOUS
Status: CANCELLED | OUTPATIENT
Start: 2020-06-14

## 2019-12-20 RX ORDER — 0.9 % SODIUM CHLORIDE 0.9 %
10 VIAL (ML) INJECTION ONCE
Status: CANCELLED | OUTPATIENT
Start: 2020-06-14

## 2019-12-20 RX ORDER — METHYLPREDNISOLONE SODIUM SUCCINATE 125 MG/2ML
125 INJECTION, POWDER, LYOPHILIZED, FOR SOLUTION INTRAMUSCULAR; INTRAVENOUS ONCE
Status: CANCELLED | OUTPATIENT
Start: 2020-06-14

## 2019-12-20 RX ADMIN — DENOSUMAB 60 MG: 60 INJECTION SUBCUTANEOUS at 15:31

## 2020-12-28 ENCOUNTER — HOSPITAL ENCOUNTER (OUTPATIENT)
Dept: WOMENS IMAGING | Age: 65
Discharge: HOME | End: 2020-12-30
Payer: COMMERCIAL

## 2020-12-28 ENCOUNTER — HOSPITAL ENCOUNTER (OUTPATIENT)
Dept: GENERAL RADIOLOGY | Age: 65
Discharge: HOME | End: 2020-12-30
Payer: COMMERCIAL

## 2020-12-28 ENCOUNTER — HOSPITAL ENCOUNTER (OUTPATIENT)
Age: 65
Discharge: HOME | End: 2020-12-30
Payer: COMMERCIAL

## 2020-12-28 PROCEDURE — 72100 X-RAY EXAM L-S SPINE 2/3 VWS: CPT

## 2020-12-28 PROCEDURE — 77063 BREAST TOMOSYNTHESIS BI: CPT

## 2020-12-28 NOTE — RESULT ENCOUNTER NOTE
Let pt know she does have a compression fracture at L4 in her back, I will refer her to neurosurgeon, dr Anabella Wood who is in [facility]. Continue taking tramadol and OTC pain relievers as needed.

## 2021-01-14 ENCOUNTER — OFFICE VISIT (OUTPATIENT)
Dept: ONCOLOGY | Age: 66
End: 2021-01-14
Payer: COMMERCIAL

## 2021-01-14 VITALS
RESPIRATION RATE: 18 BRPM | BODY MASS INDEX: 22.13 KG/M2 | SYSTOLIC BLOOD PRESSURE: 130 MMHG | HEART RATE: 85 BPM | DIASTOLIC BLOOD PRESSURE: 82 MMHG | HEIGHT: 67 IN | WEIGHT: 141 LBS | TEMPERATURE: 96.8 F

## 2021-01-14 DIAGNOSIS — C50.912 MALIGNANT NEOPLASM OF LEFT BREAST IN FEMALE, ESTROGEN RECEPTOR POSITIVE, UNSPECIFIED SITE OF BREAST (HCC): Primary | ICD-10-CM

## 2021-01-14 DIAGNOSIS — Z17.0 MALIGNANT NEOPLASM OF LEFT BREAST IN FEMALE, ESTROGEN RECEPTOR POSITIVE, UNSPECIFIED SITE OF BREAST (HCC): Primary | ICD-10-CM

## 2021-01-14 PROCEDURE — 99214 OFFICE O/P EST MOD 30 MIN: CPT | Performed by: INTERNAL MEDICINE

## 2021-01-14 NOTE — PROGRESS NOTES
Reason for the visit:   Chief Complaint   Patient presents with    Follow-up     breast cancer       Pertinent Clinical Problems/ Treatments:    1. Left sided breast cancer,[id number], ER positive/NH positive/HER-2 positive,  2. S/p left mastectomy , pt choice,  S/p Dose dense AC x 4  3. Pt started weekly herceptin /taxol on 7/8/14,  week 12 done 9/23/14. Maintenance Herceptin every 3 weeks , completed one year of therapy. 4. Osteoporosis with T score of -3.1, we'll continue vitamin D/calcium and Prolia   5. On adjuvant hormone therapy, initially started on Arimidex then switched to Aromasin discontinued May 2019 due to increasing side effects with fatigue. Summary of the case/HPI :  The patient who had no major comorbidities was diagnosed to have left sided breast cancer. It was ER positive/NH positive/HER-2 positive, she elected to undergo left mastectomy. patient subsequently started on adjuvant chemotherapy. She received 4 doses of dose dense AC. She has handled  chemotherapy well. She completed one year of herceptin. And after chemotherapy she was maintained of aromatse inhibitor. She developed osteoporosis and was started on prolia     Interim history:  Patient seen for follow-up breast cancer. Doing well clinically. She is off all hormone therapy   Previous DEXA scan showed osteoporosis and patient was started on Prolia 3 years back. She had 1 or 2 doses and it was not given at that because of insurance reasons. I was not clear of what happened exactly. Patient tells that medicine was not shipped to the facility. Indicates she did not have Prolia for the last year. She had recent back pain that she had images few days back which showed compression fracture of L3. Patient will be seen by orthopedics. Patient is doing fine in regard to her breast cancer. There is no lumps or masses. No enlarged lymph nodes.     Past Medical History   has a past medical history of Anxiety, Bone spur, Breast cancer ([facility] [address] Depression, Diverticulosis of large intestine without hemorrhage, Essential hypertension, Osteoarthritis, Seasonal allergies, Wears dentures, and Wrist fracture. Surgical History   has a past surgical history that includes Carpal tunnel release (2005); other surgical history (approx 2005); Mastectomy (Left, 03/18/2014); Tunneled venous port placement (Right, 4/30/14); Breast surgery (Left, 3/18/14); Breast biopsy (2-18-14); Breast reconstruction ([id number]); other surgical history; Breast reconstruction (Bilateral); Bunionectomy (Left, 11/08/2017); Bunionectomy (Left, 11/8/2017); pr colon ca scrn not hi rsk ind (N/A, 12/4/2017); and Colonoscopy (12/04/2017). Home Medications  has a current medication list which includes the following prescription(s): alprazolam, tramadol, lisinopril, doxylamine succinate (sleep), diphenhydramine hcl (sleep), fluticasone, prochlorperazine, b complex vitamins, mucinex dm, fexofenadine, calcium, vitamin d3, and aspirin. Allergies:  No known allergies        Review of Systems :      Constitutional: No fever or chills. No night sweats, progressive fatigue  HEENT: negative for sore mouth, sore throat, hoarseness and voice change   Respiratory: negative for cough , sputum, dyspnea, wheezing, hemoptysis, chest pain   Cardiovascular: negative for chest pain, dyspnea, palpitations, orthopnea, PND   Gastrointestinal: Mild intermittent nausea, no vomiting, diarrhea, constipation, abdominal pain,   Genitourinary: negative for frequency, dysuria, nocturia, urinary incontinence, and hematuria   Hematologic/Lymphatic: negative for easy bruising, bleeding, lymphadenopathy, petechiae and swelling/edema   Endocrine: negative for heat or cold intolerance, tremor, weight changes, change in bowel habits and hair loss   Musculoskeletal: Mild aches and pains since he started hormone therapy back pain. Neurological: Neuropathy.       Physical Examination :       /82 (Site: Right Upper Arm, Position: Sitting, Cuff Size: Medium Adult)   Pulse 85   Temp 96.8 °F (36 °C) (Temporal)   Resp 18   Ht 5' 7\" (1.702 m)   Wt 141 lb (64 kg)   BMI 22.08 kg/m²     Performance Status:Estimated performance status is ECOG 0    General appearance - well appearing, no in pain or distress ,alopecai   Mental status - alert and cooperative   Neck - supple, right submandibular swelling about 1 cm, softer and less tender than before  Mouth exam: No masses could be seen, no ulcers   Lymphatics - no palpable lymphadenopathy, no hepatosplenomegaly   Chest - clear to auscultation, no wheezes, rales or rhonchi, symmetric air entry   Left side mastectomy s/p reconstruction. No axillary LN enlargement. Right breast exam: No masses felt and no axillary adenopathy. S/p breast reduction surgery. Heart - normal rate, regular rhythm, normal S1, S2, no murmurs,  Abdomen - soft, nontender, nondistended, no masses or organomegaly   Neurological - alert, oriented, normal speech, no focal findings or movement disorder noted   Musculoskeletal - no joint tenderness, deformity or swelling   Extremities - peripheral pulses normal, no pedal edema, no clubbing or cyanosis   Skin - normal coloration and turgor, no rashes, no suspicious skin lesions noted        Mamm 11/19  Impression   No mammographic evidence of malignancy.       BI-RADS 1       BIRADS:   BIRADS - CATEGORY 1       Negative, no evidence of malignancy.  Normal interval follow-up is   recommended in 12 months.       OVERALL ASSESSMENT - NEGATIVE       A letter of notification will be sent to the patient regarding the results. Assessment:    1. Left side stage I breast cancer, ER positive/GA positive/HER-2 positive, status post left mastectomy. She finished 4 cycles of dose dense AC. She received 12 weeks of weekly Taxol and Herceptin concluded on Sep 23, 2014.   She was given 15 cycles of single agent Herceptin that concluded June 23, 2015 and also is taking Aromasin. Discontinued May 2019. No evidence of local recurrence or new primary according to the mammography. 2. 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 Followed in pain clinic. 3. Severe osteoporosis with T score of -3.1, patient take vitamin D and calcium and also will continue Prolia  60 mg every 6 months. 4. Back pain. 5. Compression fracture of L3. Plan:   The patient discontinued endocrine therapy early on after about 3 to 4 years of aromatase inhibitors. It has been more than 5 years since her diagnosis anyway. Patient is doing fine with no evidence of recurrence of her breast cancer. She can be observed. No further work-up is needed from our perspective. She will continue monthly self breast examination and annual mammogram.    Patient's previous DEXA scan showed osteoporosis and she was prescribed Prolia. She received nothing over the last year due to insurance coverage she tells me. Recently images showed compression fracture of L3. She will be seen by orthopedics. She may need kyphoplasty. I explained the importance of treatment of osteoporosis. We will try to resume the treatment and check on insurance coverage. Patient will continue vitamin D and calcium. She will have DEXA scan every 2 years. See her in 1 year. Sooner for any problems. [facility]                            This note is created with the assistance of a speech recognition program.  While intending to generate a document that actually reflects the content of the visit, the document can still have some errors including those of syntax and sound a like substitutions which may escape proof reading. It such instances, actual meaning can be extrapolated by contextual diversion.

## 2021-02-09 ENCOUNTER — HOSPITAL ENCOUNTER (OUTPATIENT)
Dept: INFUSION THERAPY | Age: 66
Discharge: HOME | End: 2021-02-09
Payer: COMMERCIAL

## 2021-02-09 VITALS
HEART RATE: 84 BPM | SYSTOLIC BLOOD PRESSURE: 148 MMHG | RESPIRATION RATE: 16 BRPM | TEMPERATURE: 96.2 F | DIASTOLIC BLOOD PRESSURE: 76 MMHG

## 2021-02-09 DIAGNOSIS — M80.08XA OSTEOPOROSIS WITH PATHOLOGICAL FRACTURE OF LUMBAR VERTEBRA (HCC): Primary | ICD-10-CM

## 2021-02-09 PROCEDURE — 96401 CHEMO ANTI-NEOPL SQ/IM: CPT

## 2021-02-09 PROCEDURE — 6360000002 HC RX W HCPCS: Performed by: INTERNAL MEDICINE

## 2021-02-09 RX ORDER — DIPHENHYDRAMINE HYDROCHLORIDE 50 MG/ML
50 INJECTION INTRAMUSCULAR; INTRAVENOUS ONCE
Status: CANCELLED | OUTPATIENT
Start: 2021-02-09 | End: 2021-02-09

## 2021-02-09 RX ORDER — SODIUM CHLORIDE 9 MG/ML
100 INJECTION, SOLUTION INTRAVENOUS CONTINUOUS
Status: CANCELLED | OUTPATIENT
Start: 2021-02-09

## 2021-02-09 RX ORDER — 0.9 % SODIUM CHLORIDE 0.9 %
10 VIAL (ML) INJECTION ONCE
Status: CANCELLED | OUTPATIENT
Start: 2021-06-12 | End: 2021-06-12

## 2021-02-09 RX ORDER — 0.9 % SODIUM CHLORIDE 0.9 %
10 VIAL (ML) INJECTION ONCE
Status: CANCELLED | OUTPATIENT
Start: 2021-02-09 | End: 2021-02-09

## 2021-02-09 RX ORDER — METHYLPREDNISOLONE SODIUM SUCCINATE 125 MG/2ML
125 INJECTION, POWDER, LYOPHILIZED, FOR SOLUTION INTRAMUSCULAR; INTRAVENOUS ONCE
Status: CANCELLED | OUTPATIENT
Start: 2021-02-09 | End: 2021-02-09

## 2021-02-09 RX ORDER — SODIUM CHLORIDE 9 MG/ML
100 INJECTION, SOLUTION INTRAVENOUS CONTINUOUS
Status: CANCELLED | OUTPATIENT
Start: 2021-06-12

## 2021-02-09 RX ORDER — DIPHENHYDRAMINE HYDROCHLORIDE 50 MG/ML
50 INJECTION INTRAMUSCULAR; INTRAVENOUS ONCE
Status: CANCELLED | OUTPATIENT
Start: 2021-06-12 | End: 2021-06-12

## 2021-02-09 RX ORDER — METHYLPREDNISOLONE SODIUM SUCCINATE 125 MG/2ML
125 INJECTION, POWDER, LYOPHILIZED, FOR SOLUTION INTRAMUSCULAR; INTRAVENOUS ONCE
Status: CANCELLED | OUTPATIENT
Start: 2021-06-12 | End: 2021-06-12

## 2021-02-09 RX ADMIN — DENOSUMAB 60 MG: 60 INJECTION SUBCUTANEOUS at 15:10

## 2021-02-09 ASSESSMENT — PAIN DESCRIPTION - PAIN TYPE: TYPE: ACUTE PAIN

## 2021-02-09 ASSESSMENT — PAIN DESCRIPTION - DESCRIPTORS: DESCRIPTORS: ACHING

## 2021-02-09 ASSESSMENT — PAIN DESCRIPTION - ORIENTATION: ORIENTATION: LOWER

## 2021-02-09 ASSESSMENT — PAIN DESCRIPTION - LOCATION: LOCATION: BACK

## 2021-02-09 NOTE — PLAN OF CARE
Problem: KNOWLEDGE DEFICIT  Goal: Patient/S.O. demonstrates understanding of disease process, treatment plan, medications, and discharge instructions.   2/9/2021 1513 by Simeon Eng RN  Outcome: Met This Shift  2/9/2021 1506 by Maribel Senior RN  Outcome: Met This Shift

## 2021-02-10 ENCOUNTER — HOSPITAL ENCOUNTER (OUTPATIENT)
Dept: MRI IMAGING | Age: 66
Discharge: HOME | End: 2021-02-12
Payer: COMMERCIAL

## 2021-02-10 DIAGNOSIS — S32.040A COMPRESSION FRACTURE OF L4 VERTEBRA, INITIAL ENCOUNTER (HCC): ICD-10-CM

## 2021-02-10 DIAGNOSIS — S32.030A COMPRESSION FRACTURE OF L3 VERTEBRA, INITIAL ENCOUNTER (HCC): ICD-10-CM

## 2021-02-10 PROCEDURE — 72148 MRI LUMBAR SPINE W/O DYE: CPT

## 2021-02-11 ENCOUNTER — TELEPHONE (OUTPATIENT)
Dept: WOUND CARE | Age: 66
End: 2021-02-11

## 2021-02-11 NOTE — TELEPHONE ENCOUNTER
Dr. Zo De León sent referral for podiatry in Jan 2021. Attempted to reach 3 times no answer. Called today to schedule appointment and states has fungus.   Advised we don't treat fungus and numbers to various other podiatrist given

## 2021-03-30 ENCOUNTER — TELEPHONE (OUTPATIENT)
Dept: ONCOLOGY | Age: 66
End: 2021-03-30

## 2021-03-30 NOTE — TELEPHONE ENCOUNTER
Cara Pierson states will need letter of med necessity from Dr Ava Park for pt services on 2/9/2021 if $6243.70. Due to denial from pt's insurance company. MD statement of med nex=cessity can be faxed to [facility]: Shana [phone]. If under 40 pages. Unable to forward message so Cara Pierson at denial given [place] phone number and Selina Wynn in [facility] notified of message place in [facility] regarding phone call for this pt. Message also sent to Dr Keven Spivey per Epic.

## 2021-08-09 ENCOUNTER — HOSPITAL ENCOUNTER (OUTPATIENT)
Dept: INFUSION THERAPY | Age: 66
Discharge: HOME | End: 2021-08-09
Payer: COMMERCIAL

## 2021-08-09 VITALS
SYSTOLIC BLOOD PRESSURE: 128 MMHG | TEMPERATURE: 97.2 F | HEART RATE: 94 BPM | RESPIRATION RATE: 18 BRPM | DIASTOLIC BLOOD PRESSURE: 85 MMHG

## 2021-08-09 DIAGNOSIS — M80.08XA OSTEOPOROSIS WITH PATHOLOGICAL FRACTURE OF LUMBAR VERTEBRA (HCC): Primary | ICD-10-CM

## 2021-08-09 PROCEDURE — 6360000002 HC RX W HCPCS: Performed by: INTERNAL MEDICINE

## 2021-08-09 PROCEDURE — 96372 THER/PROPH/DIAG INJ SC/IM: CPT

## 2021-08-09 RX ORDER — 0.9 % SODIUM CHLORIDE 0.9 %
10 VIAL (ML) INJECTION ONCE
Status: CANCELLED | OUTPATIENT
Start: 2022-02-04 | End: 2022-02-04

## 2021-08-09 RX ORDER — METHYLPREDNISOLONE SODIUM SUCCINATE 125 MG/2ML
125 INJECTION, POWDER, LYOPHILIZED, FOR SOLUTION INTRAMUSCULAR; INTRAVENOUS ONCE
Status: CANCELLED | OUTPATIENT
Start: 2022-02-04 | End: 2022-02-04

## 2021-08-09 RX ORDER — DIPHENHYDRAMINE HYDROCHLORIDE 50 MG/ML
50 INJECTION INTRAMUSCULAR; INTRAVENOUS ONCE
Status: CANCELLED | OUTPATIENT
Start: 2022-02-04 | End: 2022-02-04

## 2021-08-09 RX ORDER — SODIUM CHLORIDE 9 MG/ML
100 INJECTION, SOLUTION INTRAVENOUS CONTINUOUS
Status: CANCELLED | OUTPATIENT
Start: 2022-02-04

## 2021-08-09 RX ADMIN — DENOSUMAB 60 MG: 60 INJECTION SUBCUTANEOUS at 15:14

## 2021-08-09 ASSESSMENT — PAIN DESCRIPTION - LOCATION: LOCATION: BACK

## 2021-08-09 ASSESSMENT — PAIN SCALES - GENERAL: PAINLEVEL_OUTOF10: 6

## 2021-08-09 ASSESSMENT — PAIN DESCRIPTION - PAIN TYPE: TYPE: CHRONIC PAIN

## 2021-08-24 ENCOUNTER — HOSPITAL ENCOUNTER (OUTPATIENT)
Age: 66
Setting detail: SPECIMEN
Discharge: HOME | End: 2021-08-24
Payer: COMMERCIAL

## 2021-08-24 DIAGNOSIS — Z01.419 WELL FEMALE EXAM WITH ROUTINE GYNECOLOGICAL EXAM: ICD-10-CM

## 2021-08-24 PROCEDURE — G0145 SCR C/V CYTO,THINLAYER,RESCR: HCPCS

## 2021-08-31 LAB — CYTOLOGY REPORT: NORMAL

## 2022-01-20 ENCOUNTER — HOSPITAL ENCOUNTER (OUTPATIENT)
Dept: WOMENS IMAGING | Age: 67
Discharge: HOME | End: 2022-01-22

## 2022-01-20 DIAGNOSIS — Z12.31 BREAST CANCER SCREENING BY MAMMOGRAM: ICD-10-CM

## 2022-01-20 PROCEDURE — 77063 BREAST TOMOSYNTHESIS BI: CPT

## 2022-03-07 ENCOUNTER — HOSPITAL ENCOUNTER (OUTPATIENT)
Dept: INFUSION THERAPY | Age: 67
Discharge: HOME | End: 2022-03-07

## 2022-03-07 VITALS
SYSTOLIC BLOOD PRESSURE: 147 MMHG | TEMPERATURE: 97.8 F | HEART RATE: 102 BPM | RESPIRATION RATE: 18 BRPM | DIASTOLIC BLOOD PRESSURE: 86 MMHG

## 2022-03-07 DIAGNOSIS — M80.08XA OSTEOPOROSIS WITH PATHOLOGICAL FRACTURE OF LUMBAR VERTEBRA (HCC): Primary | ICD-10-CM

## 2022-03-07 PROCEDURE — 96372 THER/PROPH/DIAG INJ SC/IM: CPT

## 2022-03-07 PROCEDURE — 6360000002 HC RX W HCPCS: Performed by: INTERNAL MEDICINE

## 2022-03-07 RX ORDER — DIPHENHYDRAMINE HYDROCHLORIDE 50 MG/ML
50 INJECTION INTRAMUSCULAR; INTRAVENOUS ONCE
Status: CANCELLED | OUTPATIENT
Start: 2022-08-03 | End: 2022-08-03

## 2022-03-07 RX ORDER — TRAMADOL HYDROCHLORIDE 50 MG/1
50 TABLET ORAL EVERY 6 HOURS PRN
COMMUNITY

## 2022-03-07 RX ORDER — SODIUM CHLORIDE 9 MG/ML
100 INJECTION, SOLUTION INTRAVENOUS CONTINUOUS
Status: CANCELLED | OUTPATIENT
Start: 2022-08-03

## 2022-03-07 RX ORDER — 0.9 % SODIUM CHLORIDE 0.9 %
10 VIAL (ML) INJECTION ONCE
Status: CANCELLED | OUTPATIENT
Start: 2022-08-03 | End: 2022-08-03

## 2022-03-07 RX ORDER — METHYLPREDNISOLONE SODIUM SUCCINATE 125 MG/2ML
125 INJECTION, POWDER, LYOPHILIZED, FOR SOLUTION INTRAMUSCULAR; INTRAVENOUS ONCE
Status: CANCELLED | OUTPATIENT
Start: 2022-08-03 | End: 2022-08-03

## 2022-03-07 RX ADMIN — DENOSUMAB 60 MG: 60 INJECTION SUBCUTANEOUS at 13:46

## 2022-07-14 ENCOUNTER — OFFICE VISIT (OUTPATIENT)
Dept: ONCOLOGY | Age: 67
End: 2022-07-14
Payer: MEDICARE

## 2022-07-14 VITALS
TEMPERATURE: 96.6 F | DIASTOLIC BLOOD PRESSURE: 79 MMHG | SYSTOLIC BLOOD PRESSURE: 152 MMHG | WEIGHT: 143.6 LBS | BODY MASS INDEX: 22.49 KG/M2 | HEART RATE: 86 BPM | RESPIRATION RATE: 16 BRPM

## 2022-07-14 DIAGNOSIS — Z17.0 MALIGNANT NEOPLASM OF LEFT BREAST IN FEMALE, ESTROGEN RECEPTOR POSITIVE, UNSPECIFIED SITE OF BREAST (HCC): ICD-10-CM

## 2022-07-14 DIAGNOSIS — M80.08XA OSTEOPOROSIS WITH PATHOLOGICAL FRACTURE OF LUMBAR VERTEBRA (HCC): ICD-10-CM

## 2022-07-14 DIAGNOSIS — C80.1 NEUROPATHY ASSOCIATED WITH CANCER (HCC): Primary | ICD-10-CM

## 2022-07-14 DIAGNOSIS — C50.912 MALIGNANT NEOPLASM OF LEFT BREAST IN FEMALE, ESTROGEN RECEPTOR POSITIVE, UNSPECIFIED SITE OF BREAST (HCC): ICD-10-CM

## 2022-07-14 DIAGNOSIS — M80.08XA OSTEOPOROSIS WITH PATHOLOGICAL FRACTURE OF LUMBAR VERTEBRA (HCC): Primary | ICD-10-CM

## 2022-07-14 DIAGNOSIS — Z90.12 S/P LEFT MASTECTOMY: ICD-10-CM

## 2022-07-14 DIAGNOSIS — G63 NEUROPATHY ASSOCIATED WITH CANCER (HCC): Primary | ICD-10-CM

## 2022-07-14 PROCEDURE — 99214 OFFICE O/P EST MOD 30 MIN: CPT | Performed by: INTERNAL MEDICINE

## 2022-07-14 PROCEDURE — 1123F ACP DISCUSS/DSCN MKR DOCD: CPT | Performed by: INTERNAL MEDICINE

## 2022-07-14 RX ORDER — SENNOSIDES 8.6 MG
650 CAPSULE ORAL EVERY 8 HOURS PRN
COMMUNITY

## 2022-07-14 NOTE — PROGRESS NOTES
Reason for the visit:   Chief Complaint   Patient presents with    Follow-up    Peripheral Neuropathy     finger tips and feet        Pertinent Clinical Problems/ Treatments:    1. Left sided breast cancer,[id number], ER positive/MT positive/HER-2 positive,  2. S/p left mastectomy , pt choice,  S/p Dose dense AC x 4  3. Pt started weekly herceptin /taxol on 7/8/14,  week 12 done 9/23/14. Maintenance Herceptin every 3 weeks , completed one year of therapy. 4. Osteoporosis with T score of -3.1, we'll continue vitamin D/calcium and Prolia   5. On adjuvant hormone therapy, initially started on Arimidex then switched to Aromasin discontinued May 2019 due to increasing side effects with fatigue. Summary of the case/HPI :  The patient who had no major comorbidities was diagnosed to have left sided breast cancer. It was ER positive/MT positive/HER-2 positive, she elected to undergo left mastectomy. patient subsequently started on adjuvant chemotherapy. She received 4 doses of dose dense AC. She has handled  chemotherapy well. She completed one year of herceptin. And after chemotherapy she was maintained of aromatse inhibitor. She developed osteoporosis and was started on prolia   Chronic back pain and she had compression fracture status post kyphoplasty in 20    Interim history:  Chronic back pain  Persistent lower extremity neuropathy  Status post kyphoplasty  Mammogram reviewed and no evidence of cancer recurrence    Past Medical History   has a past medical history of Anxiety, Bone spur, Breast cancer ([address] Depression, Diverticulosis of large intestine without hemorrhage, Essential hypertension, Osteoarthritis, Seasonal allergies, Wears dentures, and Wrist fracture. Surgical History   has a past surgical history that includes Carpal tunnel release (2005); other surgical history (approx 2005); Mastectomy (Left, 03/18/2014); Tunneled venous port placement (Right, 04/30/2014);  Breast surgery (Left, 03/18/2014); Breast biopsy (02/18/2014); Breast reconstruction (04/2015); other surgical history; Breast reconstruction (Bilateral); Bunionectomy (Left, 11/08/2017); Bunionectomy (Left, 11/08/2017); pr colon ca scrn not hi rsk ind (N/A, 12/04/2017); Colonoscopy (12/04/2017); and back surgery. Home Medications  has a current medication list which includes the following prescription(s): acetaminophen, alprazolam, tramadol, lisinopril, doxylamine succinate (sleep), diphenhydramine hcl (sleep), fluticasone, b complex vitamins, mucinex dm, fexofenadine, calcium, vitamin d3, aspirin, duloxetine, and prochlorperazine. Allergies:  No known allergies        Review of Systems :      Constitutional: No fever or chills. No night sweats, progressive fatigue  HEENT: negative for sore mouth, sore throat, hoarseness and voice change   Respiratory: negative for cough , sputum, dyspnea, wheezing, hemoptysis, chest pain   Cardiovascular: negative for chest pain, dyspnea, palpitations, orthopnea, PND   Gastrointestinal: Mild intermittent nausea, no vomiting, diarrhea, constipation, abdominal pain,   Genitourinary: negative for frequency, dysuria, nocturia, urinary incontinence, and hematuria   Hematologic/Lymphatic: negative for easy bruising, bleeding, lymphadenopathy, petechiae and swelling/edema   Endocrine: negative for heat or cold intolerance, tremor, weight changes, change in bowel habits and hair loss   Musculoskeletal: Mild aches and pains since he started hormone therapy back pain. Neurological: Neuropathy.       Physical Examination :       BP (!) 152/79   Pulse 86   Temp (!) 96.6 °F (35.9 °C) (Temporal)   Resp 16   Wt 143 lb 9.6 oz (65.1 kg)   LMP  (LMP Unknown)   BMI 22.49 kg/m²     Performance Status:Estimated performance status is ECOG 0    General appearance - well appearing, no in pain or distress ,alopecai   Mental status - alert and cooperative   Neck - supple, right submandibular swelling about 1 cm, softer and less tender than before  Mouth exam: No masses could be seen, no ulcers   Lymphatics - no palpable lymphadenopathy, no hepatosplenomegaly   Chest - clear to auscultation, no wheezes, rales or rhonchi, symmetric air entry   Left side mastectomy s/p reconstruction. No axillary LN enlargement. Right breast exam: No masses felt and no axillary adenopathy. S/p breast reduction surgery. Heart - normal rate, regular rhythm, normal S1, S2, no murmurs,  Abdomen - soft, nontender, nondistended, no masses or organomegaly   Neurological - alert, oriented, normal speech, no focal findings or movement disorder noted   Musculoskeletal - no joint tenderness, deformity or swelling   Extremities - peripheral pulses normal, no pedal edema, no clubbing or cyanosis   Skin - normal coloration and turgor, no rashes, no suspicious skin lesions noted        Mamm 11/19  Impression   No mammographic evidence of malignancy.       BI-RADS 1       BIRADS:   BIRADS - CATEGORY 1       Negative, no evidence of malignancy.  Normal interval follow-up is   recommended in 12 months.       OVERALL ASSESSMENT - NEGATIVE       A letter of notification will be sent to the patient regarding the results. Assessment:    1. Left side stage I breast cancer, ER positive/NM positive/HER-2 positive, status post left mastectomy. She finished 4 cycles of dose dense AC. She received 12 weeks of weekly Taxol and Herceptin concluded on Sep 23, 2014. She was given 15 cycles of single agent Herceptin that concluded June 23, 2015 and also is taking Aromasin. Discontinued May 2019. No evidence of local recurrence or new primary according to the mammography. 2. 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 Followed in pain clinic. 3. Severe osteoporosis with T score of -3.1, patient take vitamin D and calcium and also will continue Prolia  60 mg every 6 months. 4. Back pain. 5. Compression fracture of L3. Status post kyphoplasty      Plan:   Status post aromatase inhibitors  Continue surveillance with mammogram in 6 months  Continue Prolia calcium and vitamin D  Obtain bone density scan  RTC in 6 months                                    [facility]                            This note is created with the assistance of a speech recognition program.  While intending to generate a document that actually reflects the content of the visit, the document can still have some errors including those of syntax and sound a like substitutions which may escape proof reading. It such instances, actual meaning can be extrapolated by contextual diversion.

## 2022-07-17 ENCOUNTER — HOSPITAL ENCOUNTER (INPATIENT)
Age: 67
LOS: 1 days | Discharge: HOME | DRG: 343 | End: 2022-07-19
Attending: STUDENT IN AN ORGANIZED HEALTH CARE EDUCATION/TRAINING PROGRAM | Admitting: INTERNAL MEDICINE
Payer: MEDICARE

## 2022-07-17 ENCOUNTER — APPOINTMENT (OUTPATIENT)
Dept: CT IMAGING | Age: 67
DRG: 343 | End: 2022-07-17
Payer: MEDICARE

## 2022-07-17 DIAGNOSIS — K35.30 ACUTE APPENDICITIS WITH LOCALIZED PERITONITIS, WITHOUT PERFORATION, ABSCESS, OR GANGRENE: Primary | ICD-10-CM

## 2022-07-17 DIAGNOSIS — K37 APPENDICITIS, UNSPECIFIED APPENDICITIS TYPE: ICD-10-CM

## 2022-07-17 LAB
ABSOLUTE EOS #: 0.22 K/UL (ref 0–0.44)
ABSOLUTE IMMATURE GRANULOCYTE: 0.07 K/UL (ref 0–0.3)
ABSOLUTE LYMPH #: 2.05 K/UL (ref 1.1–3.7)
ABSOLUTE MONO #: 1.04 K/UL (ref 0.1–1.2)
BASOPHILS # BLD: 0 % (ref 0–2)
BASOPHILS ABSOLUTE: 0.05 K/UL (ref 0–0.2)
EOSINOPHILS RELATIVE PERCENT: 1 % (ref 1–4)
HCT VFR BLD CALC: 44.4 % (ref 36.3–47.1)
HEMOGLOBIN: 14.2 G/DL (ref 11.9–15.1)
IMMATURE GRANULOCYTES: 0 %
LYMPHOCYTES # BLD: 11 % (ref 24–43)
MCH RBC QN AUTO: 31.1 PG (ref 25.2–33.5)
MCHC RBC AUTO-ENTMCNC: 32 G/DL (ref 28.4–34.8)
MCV RBC AUTO: 97.2 FL (ref 82.6–102.9)
MONOCYTES # BLD: 6 % (ref 3–12)
NRBC AUTOMATED: 0 PER 100 WBC
PDW BLD-RTO: 13.5 % (ref 11.8–14.4)
PLATELET # BLD: 213 K/UL (ref 138–453)
PMV BLD AUTO: 10 FL (ref 8.1–13.5)
RBC # BLD: 4.57 M/UL (ref 3.95–5.11)
SEG NEUTROPHILS: 82 % (ref 36–65)
SEGMENTED NEUTROPHILS ABSOLUTE COUNT: 14.94 K/UL (ref 1.5–8.1)
WBC # BLD: 18.4 K/UL (ref 3.5–11.3)

## 2022-07-17 PROCEDURE — 80076 HEPATIC FUNCTION PANEL: CPT

## 2022-07-17 PROCEDURE — 96375 TX/PRO/DX INJ NEW DRUG ADDON: CPT

## 2022-07-17 PROCEDURE — 85025 COMPLETE CBC W/AUTO DIFF WBC: CPT

## 2022-07-17 PROCEDURE — 96374 THER/PROPH/DIAG INJ IV PUSH: CPT

## 2022-07-17 PROCEDURE — 6360000002 HC RX W HCPCS: Performed by: STUDENT IN AN ORGANIZED HEALTH CARE EDUCATION/TRAINING PROGRAM

## 2022-07-17 PROCEDURE — 2580000003 HC RX 258: Performed by: STUDENT IN AN ORGANIZED HEALTH CARE EDUCATION/TRAINING PROGRAM

## 2022-07-17 PROCEDURE — 83690 ASSAY OF LIPASE: CPT

## 2022-07-17 PROCEDURE — 80048 BASIC METABOLIC PNL TOTAL CA: CPT

## 2022-07-17 PROCEDURE — 81003 URINALYSIS AUTO W/O SCOPE: CPT

## 2022-07-17 PROCEDURE — 83605 ASSAY OF LACTIC ACID: CPT

## 2022-07-17 PROCEDURE — 36415 COLL VENOUS BLD VENIPUNCTURE: CPT

## 2022-07-17 RX ORDER — KETOROLAC TROMETHAMINE 15 MG/ML
15 INJECTION, SOLUTION INTRAMUSCULAR; INTRAVENOUS ONCE
Status: COMPLETED | OUTPATIENT
Start: 2022-07-17 | End: 2022-07-17

## 2022-07-17 RX ORDER — ONDANSETRON 2 MG/ML
4 INJECTION INTRAMUSCULAR; INTRAVENOUS ONCE
Status: COMPLETED | OUTPATIENT
Start: 2022-07-17 | End: 2022-07-17

## 2022-07-17 RX ORDER — 0.9 % SODIUM CHLORIDE 0.9 %
1000 INTRAVENOUS SOLUTION INTRAVENOUS ONCE
Status: COMPLETED | OUTPATIENT
Start: 2022-07-17 | End: 2022-07-18

## 2022-07-17 RX ADMIN — ONDANSETRON 4 MG: 2 INJECTION INTRAMUSCULAR; INTRAVENOUS at 23:46

## 2022-07-17 RX ADMIN — SODIUM CHLORIDE 1000 ML: 9 INJECTION, SOLUTION INTRAVENOUS at 23:47

## 2022-07-17 RX ADMIN — KETOROLAC TROMETHAMINE 15 MG: 15 INJECTION, SOLUTION INTRAMUSCULAR; INTRAVENOUS at 23:47

## 2022-07-18 ENCOUNTER — ANESTHESIA EVENT (OUTPATIENT)
Dept: OPERATING ROOM | Age: 67
DRG: 343 | End: 2022-07-18
Payer: MEDICARE

## 2022-07-18 ENCOUNTER — ANESTHESIA (OUTPATIENT)
Dept: OPERATING ROOM | Age: 67
DRG: 343 | End: 2022-07-18
Payer: MEDICARE

## 2022-07-18 ENCOUNTER — APPOINTMENT (OUTPATIENT)
Dept: CT IMAGING | Age: 67
DRG: 343 | End: 2022-07-18
Payer: MEDICARE

## 2022-07-18 PROBLEM — K37 APPENDICITIS: Status: ACTIVE | Noted: 2022-07-18

## 2022-07-18 PROBLEM — E44.0 MODERATE MALNUTRITION (HCC): Status: ACTIVE | Noted: 2022-07-18

## 2022-07-18 LAB
ABSOLUTE EOS #: 0.07 K/UL (ref 0–0.44)
ABSOLUTE IMMATURE GRANULOCYTE: 0.04 K/UL (ref 0–0.3)
ABSOLUTE LYMPH #: 2.11 K/UL (ref 1.1–3.7)
ABSOLUTE MONO #: 0.95 K/UL (ref 0.1–1.2)
ALBUMIN SERPL-MCNC: 3.6 G/DL (ref 3.5–5.2)
ALBUMIN SERPL-MCNC: 4.8 G/DL (ref 3.5–5.2)
ALBUMIN/GLOBULIN RATIO: 1.5 (ref 1–2.5)
ALBUMIN/GLOBULIN RATIO: 1.5 (ref 1–2.5)
ALP BLD-CCNC: 52 U/L (ref 35–104)
ALP BLD-CCNC: 69 U/L (ref 35–104)
ALT SERPL-CCNC: 13 U/L (ref 5–33)
ALT SERPL-CCNC: 16 U/L (ref 5–33)
ANION GAP SERPL CALCULATED.3IONS-SCNC: 7 MMOL/L (ref 9–17)
ANION GAP SERPL CALCULATED.3IONS-SCNC: 9 MMOL/L (ref 9–17)
ANION GAP SERPL CALCULATED.3IONS-SCNC: 9 MMOL/L (ref 9–17)
AST SERPL-CCNC: 13 U/L
AST SERPL-CCNC: 18 U/L
BASOPHILS # BLD: 0 % (ref 0–2)
BASOPHILS ABSOLUTE: 0.05 K/UL (ref 0–0.2)
BILIRUB SERPL-MCNC: 0.25 MG/DL (ref 0.3–1.2)
BILIRUB SERPL-MCNC: 0.31 MG/DL (ref 0.3–1.2)
BILIRUBIN DIRECT: <0.08 MG/DL
BILIRUBIN URINE: NEGATIVE
BILIRUBIN, INDIRECT: ABNORMAL MG/DL (ref 0–1)
BUN BLDV-MCNC: 10 MG/DL (ref 8–23)
BUN BLDV-MCNC: 10 MG/DL (ref 8–23)
BUN BLDV-MCNC: 14 MG/DL (ref 8–23)
BUN/CREAT BLD: 14 (ref 9–20)
BUN/CREAT BLD: 15 (ref 9–20)
BUN/CREAT BLD: 18 (ref 9–20)
CALCIUM SERPL-MCNC: 10.2 MG/DL (ref 8.6–10.4)
CALCIUM SERPL-MCNC: 8.2 MG/DL (ref 8.6–10.4)
CALCIUM SERPL-MCNC: 8.9 MG/DL (ref 8.6–10.4)
CHLORIDE BLD-SCNC: 101 MMOL/L (ref 98–107)
CHLORIDE BLD-SCNC: 103 MMOL/L (ref 98–107)
CHLORIDE BLD-SCNC: 103 MMOL/L (ref 98–107)
CO2: 24 MMOL/L (ref 20–31)
CO2: 25 MMOL/L (ref 20–31)
CO2: 30 MMOL/L (ref 20–31)
COLOR: YELLOW
CREAT SERPL-MCNC: 0.67 MG/DL (ref 0.5–0.9)
CREAT SERPL-MCNC: 0.7 MG/DL (ref 0.5–0.9)
CREAT SERPL-MCNC: 0.78 MG/DL (ref 0.5–0.9)
EKG ATRIAL RATE: 65 BPM
EKG P AXIS: 71 DEGREES
EKG P-R INTERVAL: 130 MS
EKG Q-T INTERVAL: 430 MS
EKG QRS DURATION: 74 MS
EKG QTC CALCULATION (BAZETT): 447 MS
EKG R AXIS: 62 DEGREES
EKG T AXIS: 38 DEGREES
EKG VENTRICULAR RATE: 65 BPM
EOSINOPHILS RELATIVE PERCENT: 0 % (ref 1–4)
GFR AFRICAN AMERICAN: >60 ML/MIN
GFR NON-AFRICAN AMERICAN: >60 ML/MIN
GFR SERPL CREATININE-BSD FRML MDRD: ABNORMAL ML/MIN/{1.73_M2}
GLUCOSE BLD-MCNC: 115 MG/DL (ref 70–99)
GLUCOSE BLD-MCNC: 130 MG/DL (ref 70–99)
GLUCOSE BLD-MCNC: 138 MG/DL (ref 70–99)
GLUCOSE URINE: NEGATIVE
HCT VFR BLD CALC: 37.3 % (ref 36.3–47.1)
HEMOGLOBIN: 12.2 G/DL (ref 11.9–15.1)
IMMATURE GRANULOCYTES: 0 %
KETONES, URINE: NEGATIVE
LACTIC ACID: 0.8 MMOL/L (ref 0.5–2.2)
LEUKOCYTE ESTERASE, URINE: NEGATIVE
LIPASE: 15 U/L (ref 13–60)
LYMPHOCYTES # BLD: 13 % (ref 24–43)
MCH RBC QN AUTO: 31.4 PG (ref 25.2–33.5)
MCHC RBC AUTO-ENTMCNC: 32.7 G/DL (ref 28.4–34.8)
MCV RBC AUTO: 96.1 FL (ref 82.6–102.9)
MONOCYTES # BLD: 6 % (ref 3–12)
NITRITE, URINE: NEGATIVE
NRBC AUTOMATED: 0 PER 100 WBC
PDW BLD-RTO: 13.4 % (ref 11.8–14.4)
PH UA: 6 (ref 5–9)
PLATELET # BLD: 169 K/UL (ref 138–453)
PMV BLD AUTO: 9.9 FL (ref 8.1–13.5)
POTASSIUM SERPL-SCNC: 3.8 MMOL/L (ref 3.7–5.3)
POTASSIUM SERPL-SCNC: 4.3 MMOL/L (ref 3.7–5.3)
POTASSIUM SERPL-SCNC: 4.6 MMOL/L (ref 3.7–5.3)
PROTEIN UA: NEGATIVE
RBC # BLD: 3.88 M/UL (ref 3.95–5.11)
SEG NEUTROPHILS: 81 % (ref 36–65)
SEGMENTED NEUTROPHILS ABSOLUTE COUNT: 12.96 K/UL (ref 1.5–8.1)
SODIUM BLD-SCNC: 136 MMOL/L (ref 135–144)
SODIUM BLD-SCNC: 137 MMOL/L (ref 135–144)
SODIUM BLD-SCNC: 138 MMOL/L (ref 135–144)
SPECIFIC GRAVITY UA: 1.02 (ref 1.01–1.02)
TOTAL PROTEIN: 6 G/DL (ref 6.4–8.3)
TOTAL PROTEIN: 7.9 G/DL (ref 6.4–8.3)
TURBIDITY: CLEAR
URINE HGB: NEGATIVE
UROBILINOGEN, URINE: NORMAL
WBC # BLD: 16.2 K/UL (ref 3.5–11.3)

## 2022-07-18 PROCEDURE — 93005 ELECTROCARDIOGRAM TRACING: CPT | Performed by: NURSE PRACTITIONER

## 2022-07-18 PROCEDURE — G0378 HOSPITAL OBSERVATION PER HR: HCPCS

## 2022-07-18 PROCEDURE — 44970 LAPAROSCOPY APPENDECTOMY: CPT | Performed by: SURGERY

## 2022-07-18 PROCEDURE — 6360000002 HC RX W HCPCS: Performed by: STUDENT IN AN ORGANIZED HEALTH CARE EDUCATION/TRAINING PROGRAM

## 2022-07-18 PROCEDURE — 2580000003 HC RX 258: Performed by: NURSE ANESTHETIST, CERTIFIED REGISTERED

## 2022-07-18 PROCEDURE — 93010 ELECTROCARDIOGRAM REPORT: CPT | Performed by: INTERNAL MEDICINE

## 2022-07-18 PROCEDURE — 74177 CT ABD & PELVIS W/CONTRAST: CPT

## 2022-07-18 PROCEDURE — 7100000001 HC PACU RECOVERY - ADDTL 15 MIN: Performed by: SURGERY

## 2022-07-18 PROCEDURE — 2500000003 HC RX 250 WO HCPCS: Performed by: NURSE ANESTHETIST, CERTIFIED REGISTERED

## 2022-07-18 PROCEDURE — 3700000001 HC ADD 15 MINUTES (ANESTHESIA): Performed by: SURGERY

## 2022-07-18 PROCEDURE — 94761 N-INVAS EAR/PLS OXIMETRY MLT: CPT

## 2022-07-18 PROCEDURE — 80053 COMPREHEN METABOLIC PANEL: CPT

## 2022-07-18 PROCEDURE — C1713 ANCHOR/SCREW BN/BN,TIS/BN: HCPCS | Performed by: SURGERY

## 2022-07-18 PROCEDURE — 2580000003 HC RX 258: Performed by: SURGERY

## 2022-07-18 PROCEDURE — 2709999900 HC NON-CHARGEABLE SUPPLY: Performed by: SURGERY

## 2022-07-18 PROCEDURE — 99285 EMERGENCY DEPT VISIT HI MDM: CPT

## 2022-07-18 PROCEDURE — 80048 BASIC METABOLIC PNL TOTAL CA: CPT

## 2022-07-18 PROCEDURE — 3600000014 HC SURGERY LEVEL 4 ADDTL 15MIN: Performed by: SURGERY

## 2022-07-18 PROCEDURE — 6360000004 HC RX CONTRAST MEDICATION: Performed by: STUDENT IN AN ORGANIZED HEALTH CARE EDUCATION/TRAINING PROGRAM

## 2022-07-18 PROCEDURE — 6360000002 HC RX W HCPCS: Performed by: NURSE ANESTHETIST, CERTIFIED REGISTERED

## 2022-07-18 PROCEDURE — 3600000004 HC SURGERY LEVEL 4 BASE: Performed by: SURGERY

## 2022-07-18 PROCEDURE — 6360000002 HC RX W HCPCS: Performed by: SURGERY

## 2022-07-18 PROCEDURE — A4216 STERILE WATER/SALINE, 10 ML: HCPCS | Performed by: NURSE ANESTHETIST, CERTIFIED REGISTERED

## 2022-07-18 PROCEDURE — 85025 COMPLETE CBC W/AUTO DIFF WBC: CPT

## 2022-07-18 PROCEDURE — 99222 1ST HOSP IP/OBS MODERATE 55: CPT | Performed by: SURGERY

## 2022-07-18 PROCEDURE — 0DTJ4ZZ RESECTION OF APPENDIX, PERCUTANEOUS ENDOSCOPIC APPROACH: ICD-10-PCS | Performed by: INTERNAL MEDICINE

## 2022-07-18 PROCEDURE — 88304 TISSUE EXAM BY PATHOLOGIST: CPT

## 2022-07-18 PROCEDURE — 36415 COLL VENOUS BLD VENIPUNCTURE: CPT

## 2022-07-18 PROCEDURE — 7100000000 HC PACU RECOVERY - FIRST 15 MIN: Performed by: SURGERY

## 2022-07-18 PROCEDURE — 2580000003 HC RX 258: Performed by: INTERNAL MEDICINE

## 2022-07-18 PROCEDURE — 2720000010 HC SURG SUPPLY STERILE: Performed by: SURGERY

## 2022-07-18 PROCEDURE — 2580000003 HC RX 258: Performed by: STUDENT IN AN ORGANIZED HEALTH CARE EDUCATION/TRAINING PROGRAM

## 2022-07-18 PROCEDURE — 3700000000 HC ANESTHESIA ATTENDED CARE: Performed by: SURGERY

## 2022-07-18 PROCEDURE — 6370000000 HC RX 637 (ALT 250 FOR IP): Performed by: INTERNAL MEDICINE

## 2022-07-18 PROCEDURE — 1200000000 HC SEMI PRIVATE

## 2022-07-18 PROCEDURE — 6370000000 HC RX 637 (ALT 250 FOR IP): Performed by: SURGERY

## 2022-07-18 PROCEDURE — 6360000002 HC RX W HCPCS: Performed by: INTERNAL MEDICINE

## 2022-07-18 RX ORDER — OXYCODONE HYDROCHLORIDE 5 MG/1
5 TABLET ORAL EVERY 4 HOURS PRN
Status: DISCONTINUED | OUTPATIENT
Start: 2022-07-18 | End: 2022-07-19 | Stop reason: HOSPADM

## 2022-07-18 RX ORDER — MORPHINE SULFATE 4 MG/ML
4 INJECTION, SOLUTION INTRAMUSCULAR; INTRAVENOUS
Status: DISCONTINUED | OUTPATIENT
Start: 2022-07-18 | End: 2022-07-19

## 2022-07-18 RX ORDER — DULOXETIN HYDROCHLORIDE 30 MG/1
30 CAPSULE, DELAYED RELEASE ORAL DAILY
Status: DISCONTINUED | OUTPATIENT
Start: 2022-07-18 | End: 2022-07-18

## 2022-07-18 RX ORDER — ACETAMINOPHEN 325 MG/1
650 TABLET ORAL EVERY 6 HOURS PRN
Status: DISCONTINUED | OUTPATIENT
Start: 2022-07-18 | End: 2022-07-18 | Stop reason: SDUPTHER

## 2022-07-18 RX ORDER — SODIUM CHLORIDE 0.9 % (FLUSH) 0.9 %
5-40 SYRINGE (ML) INJECTION PRN
Status: DISCONTINUED | OUTPATIENT
Start: 2022-07-18 | End: 2022-07-19 | Stop reason: HOSPADM

## 2022-07-18 RX ORDER — CETIRIZINE HYDROCHLORIDE 10 MG/1
10 TABLET ORAL DAILY
Status: DISCONTINUED | OUTPATIENT
Start: 2022-07-19 | End: 2022-07-19 | Stop reason: HOSPADM

## 2022-07-18 RX ORDER — METOCLOPRAMIDE HYDROCHLORIDE 5 MG/ML
10 INJECTION INTRAMUSCULAR; INTRAVENOUS
Status: DISCONTINUED | OUTPATIENT
Start: 2022-07-18 | End: 2022-07-18 | Stop reason: HOSPADM

## 2022-07-18 RX ORDER — SODIUM CHLORIDE 0.9 % (FLUSH) 0.9 %
5-40 SYRINGE (ML) INJECTION EVERY 12 HOURS SCHEDULED
Status: DISCONTINUED | OUTPATIENT
Start: 2022-07-18 | End: 2022-07-18 | Stop reason: HOSPADM

## 2022-07-18 RX ORDER — SODIUM CHLORIDE 9 MG/ML
INJECTION, SOLUTION INTRAVENOUS PRN
Status: DISCONTINUED | OUTPATIENT
Start: 2022-07-18 | End: 2022-07-19 | Stop reason: HOSPADM

## 2022-07-18 RX ORDER — ONDANSETRON 2 MG/ML
INJECTION INTRAMUSCULAR; INTRAVENOUS PRN
Status: DISCONTINUED | OUTPATIENT
Start: 2022-07-18 | End: 2022-07-18 | Stop reason: SDUPTHER

## 2022-07-18 RX ORDER — LISINOPRIL 5 MG/1
5 TABLET ORAL DAILY
Status: DISCONTINUED | OUTPATIENT
Start: 2022-07-18 | End: 2022-07-19 | Stop reason: HOSPADM

## 2022-07-18 RX ORDER — SODIUM CHLORIDE 0.9 % (FLUSH) 0.9 %
5-40 SYRINGE (ML) INJECTION EVERY 12 HOURS SCHEDULED
Status: DISCONTINUED | OUTPATIENT
Start: 2022-07-18 | End: 2022-07-19 | Stop reason: HOSPADM

## 2022-07-18 RX ORDER — ALPRAZOLAM 0.5 MG/1
0.5 TABLET ORAL NIGHTLY PRN
Status: DISCONTINUED | OUTPATIENT
Start: 2022-07-18 | End: 2022-07-19 | Stop reason: HOSPADM

## 2022-07-18 RX ORDER — FENTANYL CITRATE 50 UG/ML
INJECTION, SOLUTION INTRAMUSCULAR; INTRAVENOUS PRN
Status: DISCONTINUED | OUTPATIENT
Start: 2022-07-18 | End: 2022-07-18 | Stop reason: SDUPTHER

## 2022-07-18 RX ORDER — KETOROLAC TROMETHAMINE 30 MG/ML
INJECTION, SOLUTION INTRAMUSCULAR; INTRAVENOUS PRN
Status: DISCONTINUED | OUTPATIENT
Start: 2022-07-18 | End: 2022-07-18 | Stop reason: SDUPTHER

## 2022-07-18 RX ORDER — ONDANSETRON 2 MG/ML
4 INJECTION INTRAMUSCULAR; INTRAVENOUS
Status: DISCONTINUED | OUTPATIENT
Start: 2022-07-18 | End: 2022-07-18 | Stop reason: HOSPADM

## 2022-07-18 RX ORDER — LIDOCAINE HYDROCHLORIDE 20 MG/ML
INJECTION, SOLUTION EPIDURAL; INFILTRATION; INTRACAUDAL; PERINEURAL PRN
Status: DISCONTINUED | OUTPATIENT
Start: 2022-07-18 | End: 2022-07-18 | Stop reason: SDUPTHER

## 2022-07-18 RX ORDER — SODIUM CHLORIDE 9 MG/ML
INJECTION, SOLUTION INTRAVENOUS PRN
Status: DISCONTINUED | OUTPATIENT
Start: 2022-07-18 | End: 2022-07-18 | Stop reason: HOSPADM

## 2022-07-18 RX ORDER — TRAMADOL HYDROCHLORIDE 50 MG/1
50 TABLET ORAL EVERY 6 HOURS PRN
Status: DISCONTINUED | OUTPATIENT
Start: 2022-07-18 | End: 2022-07-18 | Stop reason: SDUPTHER

## 2022-07-18 RX ORDER — ONDANSETRON 2 MG/ML
4 INJECTION INTRAMUSCULAR; INTRAVENOUS EVERY 6 HOURS PRN
Status: DISCONTINUED | OUTPATIENT
Start: 2022-07-18 | End: 2022-07-19 | Stop reason: HOSPADM

## 2022-07-18 RX ORDER — PROPOFOL 10 MG/ML
INJECTION, EMULSION INTRAVENOUS PRN
Status: DISCONTINUED | OUTPATIENT
Start: 2022-07-18 | End: 2022-07-18 | Stop reason: SDUPTHER

## 2022-07-18 RX ORDER — SODIUM CHLORIDE 9 MG/ML
INJECTION, SOLUTION INTRAVENOUS CONTINUOUS
Status: DISCONTINUED | OUTPATIENT
Start: 2022-07-18 | End: 2022-07-18

## 2022-07-18 RX ORDER — SODIUM CHLORIDE 0.9 % (FLUSH) 0.9 %
5-40 SYRINGE (ML) INJECTION EVERY 12 HOURS SCHEDULED
Status: DISCONTINUED | OUTPATIENT
Start: 2022-07-18 | End: 2022-07-18 | Stop reason: SDUPTHER

## 2022-07-18 RX ORDER — ACETAMINOPHEN 325 MG/1
650 TABLET ORAL EVERY 4 HOURS PRN
Status: DISCONTINUED | OUTPATIENT
Start: 2022-07-18 | End: 2022-07-19 | Stop reason: HOSPADM

## 2022-07-18 RX ORDER — ONDANSETRON 4 MG/1
4 TABLET, ORALLY DISINTEGRATING ORAL EVERY 8 HOURS PRN
Status: DISCONTINUED | OUTPATIENT
Start: 2022-07-18 | End: 2022-07-19 | Stop reason: HOSPADM

## 2022-07-18 RX ORDER — KETOROLAC TROMETHAMINE 15 MG/ML
15 INJECTION, SOLUTION INTRAMUSCULAR; INTRAVENOUS
Status: COMPLETED | OUTPATIENT
Start: 2022-07-18 | End: 2022-07-18

## 2022-07-18 RX ORDER — ACETAMINOPHEN 650 MG/1
650 SUPPOSITORY RECTAL EVERY 6 HOURS PRN
Status: DISCONTINUED | OUTPATIENT
Start: 2022-07-18 | End: 2022-07-18 | Stop reason: SDUPTHER

## 2022-07-18 RX ORDER — ROCURONIUM BROMIDE 10 MG/ML
INJECTION, SOLUTION INTRAVENOUS PRN
Status: DISCONTINUED | OUTPATIENT
Start: 2022-07-18 | End: 2022-07-18 | Stop reason: SDUPTHER

## 2022-07-18 RX ORDER — DEXAMETHASONE SODIUM PHOSPHATE 10 MG/ML
INJECTION, SOLUTION INTRAMUSCULAR; INTRAVENOUS PRN
Status: DISCONTINUED | OUTPATIENT
Start: 2022-07-18 | End: 2022-07-18 | Stop reason: SDUPTHER

## 2022-07-18 RX ORDER — SODIUM CHLORIDE 0.9 % (FLUSH) 0.9 %
5-40 SYRINGE (ML) INJECTION PRN
Status: DISCONTINUED | OUTPATIENT
Start: 2022-07-18 | End: 2022-07-18 | Stop reason: HOSPADM

## 2022-07-18 RX ORDER — DEXAMETHASONE SODIUM PHOSPHATE 4 MG/ML
INJECTION, SOLUTION INTRA-ARTICULAR; INTRALESIONAL; INTRAMUSCULAR; INTRAVENOUS; SOFT TISSUE PRN
Status: DISCONTINUED | OUTPATIENT
Start: 2022-07-18 | End: 2022-07-18 | Stop reason: SDUPTHER

## 2022-07-18 RX ORDER — SODIUM CHLORIDE 9 MG/ML
INJECTION, SOLUTION INTRAVENOUS CONTINUOUS
Status: DISCONTINUED | OUTPATIENT
Start: 2022-07-18 | End: 2022-07-19

## 2022-07-18 RX ORDER — KETOROLAC TROMETHAMINE 15 MG/ML
15 INJECTION, SOLUTION INTRAMUSCULAR; INTRAVENOUS EVERY 6 HOURS PRN
Status: DISCONTINUED | OUTPATIENT
Start: 2022-07-18 | End: 2022-07-18 | Stop reason: SDUPTHER

## 2022-07-18 RX ORDER — SENNOSIDES 8.6 MG
650 CAPSULE ORAL EVERY 8 HOURS PRN
Status: DISCONTINUED | OUTPATIENT
Start: 2022-07-18 | End: 2022-07-18 | Stop reason: SDUPTHER

## 2022-07-18 RX ORDER — KETOROLAC TROMETHAMINE 15 MG/ML
15 INJECTION, SOLUTION INTRAMUSCULAR; INTRAVENOUS EVERY 6 HOURS PRN
Status: DISCONTINUED | OUTPATIENT
Start: 2022-07-18 | End: 2022-07-19 | Stop reason: HOSPADM

## 2022-07-18 RX ORDER — SODIUM CHLORIDE 9 MG/ML
INJECTION INTRAVENOUS PRN
Status: DISCONTINUED | OUTPATIENT
Start: 2022-07-18 | End: 2022-07-18 | Stop reason: SDUPTHER

## 2022-07-18 RX ORDER — SODIUM CHLORIDE 0.9 % (FLUSH) 0.9 %
10 SYRINGE (ML) INJECTION PRN
Status: DISCONTINUED | OUTPATIENT
Start: 2022-07-18 | End: 2022-07-18 | Stop reason: SDUPTHER

## 2022-07-18 RX ORDER — POLYETHYLENE GLYCOL 3350 17 G/17G
17 POWDER, FOR SOLUTION ORAL DAILY PRN
Status: DISCONTINUED | OUTPATIENT
Start: 2022-07-18 | End: 2022-07-19 | Stop reason: HOSPADM

## 2022-07-18 RX ORDER — MORPHINE SULFATE 2 MG/ML
2 INJECTION, SOLUTION INTRAMUSCULAR; INTRAVENOUS
Status: DISCONTINUED | OUTPATIENT
Start: 2022-07-18 | End: 2022-07-19

## 2022-07-18 RX ORDER — ROPIVACAINE HYDROCHLORIDE 5 MG/ML
INJECTION, SOLUTION EPIDURAL; INFILTRATION; PERINEURAL PRN
Status: DISCONTINUED | OUTPATIENT
Start: 2022-07-18 | End: 2022-07-18 | Stop reason: SDUPTHER

## 2022-07-18 RX ORDER — OXYCODONE HYDROCHLORIDE 5 MG/1
5 TABLET ORAL
Status: DISCONTINUED | OUTPATIENT
Start: 2022-07-18 | End: 2022-07-18 | Stop reason: HOSPADM

## 2022-07-18 RX ORDER — SODIUM CHLORIDE 9 MG/ML
INJECTION, SOLUTION INTRAVENOUS PRN
Status: DISCONTINUED | OUTPATIENT
Start: 2022-07-18 | End: 2022-07-18 | Stop reason: SDUPTHER

## 2022-07-18 RX ORDER — ONDANSETRON 2 MG/ML
4 INJECTION INTRAMUSCULAR; INTRAVENOUS EVERY 6 HOURS PRN
Status: DISCONTINUED | OUTPATIENT
Start: 2022-07-18 | End: 2022-07-18 | Stop reason: SDUPTHER

## 2022-07-18 RX ORDER — FENTANYL CITRATE 50 UG/ML
50 INJECTION, SOLUTION INTRAMUSCULAR; INTRAVENOUS EVERY 5 MIN PRN
Status: DISCONTINUED | OUTPATIENT
Start: 2022-07-18 | End: 2022-07-18 | Stop reason: HOSPADM

## 2022-07-18 RX ADMIN — ROCURONIUM BROMIDE 35 MG: 10 SOLUTION INTRAVENOUS at 14:04

## 2022-07-18 RX ADMIN — FENTANYL CITRATE 100 MCG: 50 INJECTION INTRAMUSCULAR; INTRAVENOUS at 13:38

## 2022-07-18 RX ADMIN — SUGAMMADEX 100 MG: 100 INJECTION, SOLUTION INTRAVENOUS at 14:45

## 2022-07-18 RX ADMIN — OXYCODONE 5 MG: 5 TABLET ORAL at 17:16

## 2022-07-18 RX ADMIN — SODIUM CHLORIDE: 9 INJECTION, SOLUTION INTRAVENOUS at 17:16

## 2022-07-18 RX ADMIN — PIPERACILLIN AND TAZOBACTAM 3375 MG: 3; .375 INJECTION, POWDER, FOR SOLUTION INTRAVENOUS at 08:30

## 2022-07-18 RX ADMIN — ROPIVACAINE HYDROCHLORIDE 40 ML: 5 INJECTION, SOLUTION EPIDURAL; INFILTRATION; PERINEURAL at 13:40

## 2022-07-18 RX ADMIN — ALPRAZOLAM 0.5 MG: 0.5 TABLET ORAL at 21:48

## 2022-07-18 RX ADMIN — SODIUM CHLORIDE: 9 INJECTION, SOLUTION INTRAVENOUS at 02:40

## 2022-07-18 RX ADMIN — ONDANSETRON 4 MG: 2 INJECTION INTRAMUSCULAR; INTRAVENOUS at 11:19

## 2022-07-18 RX ADMIN — SODIUM CHLORIDE 20 ML: 9 INJECTION INTRAMUSCULAR; INTRAVENOUS; SUBCUTANEOUS at 13:40

## 2022-07-18 RX ADMIN — LIDOCAINE HYDROCHLORIDE 100 MG: 20 INJECTION, SOLUTION EPIDURAL; INFILTRATION; INTRACAUDAL; PERINEURAL at 14:04

## 2022-07-18 RX ADMIN — OXYCODONE 5 MG: 5 TABLET ORAL at 21:44

## 2022-07-18 RX ADMIN — KETOROLAC TROMETHAMINE 15 MG: 15 INJECTION, SOLUTION INTRAMUSCULAR; INTRAVENOUS at 06:36

## 2022-07-18 RX ADMIN — ONDANSETRON 4 MG: 2 INJECTION INTRAMUSCULAR; INTRAVENOUS at 14:39

## 2022-07-18 RX ADMIN — PIPERACILLIN AND TAZOBACTAM 3375 MG: 3; .375 INJECTION, POWDER, FOR SOLUTION INTRAVENOUS at 17:19

## 2022-07-18 RX ADMIN — IOPAMIDOL 75 ML: 755 INJECTION, SOLUTION INTRAVENOUS at 00:24

## 2022-07-18 RX ADMIN — LISINOPRIL 5 MG: 5 TABLET ORAL at 17:16

## 2022-07-18 RX ADMIN — FENTANYL CITRATE 50 MCG: 50 INJECTION, SOLUTION INTRAMUSCULAR; INTRAVENOUS at 15:36

## 2022-07-18 RX ADMIN — DEXAMETHASONE SODIUM PHOSPHATE 4 MG: 4 INJECTION, SOLUTION INTRAMUSCULAR; INTRAVENOUS at 14:04

## 2022-07-18 RX ADMIN — SUGAMMADEX 100 MG: 100 INJECTION, SOLUTION INTRAVENOUS at 14:48

## 2022-07-18 RX ADMIN — DEXAMETHASONE SODIUM PHOSPHATE 10 MG: 10 INJECTION, SOLUTION INTRAMUSCULAR; INTRAVENOUS at 13:40

## 2022-07-18 RX ADMIN — FENTANYL CITRATE 50 MCG: 50 INJECTION, SOLUTION INTRAMUSCULAR; INTRAVENOUS at 15:25

## 2022-07-18 RX ADMIN — SODIUM CHLORIDE: 9 INJECTION, SOLUTION INTRAVENOUS at 11:22

## 2022-07-18 RX ADMIN — ONDANSETRON 4 MG: 2 INJECTION INTRAMUSCULAR; INTRAVENOUS at 20:34

## 2022-07-18 RX ADMIN — KETOROLAC TROMETHAMINE 30 MG: 30 INJECTION, SOLUTION INTRAMUSCULAR at 14:39

## 2022-07-18 RX ADMIN — PROPOFOL 150 MG: 10 INJECTION, EMULSION INTRAVENOUS at 14:04

## 2022-07-18 RX ADMIN — PIPERACILLIN SODIUM AND TAZOBACTAM SODIUM 4500 MG: 4; .5 INJECTION, POWDER, LYOPHILIZED, FOR SOLUTION INTRAVENOUS at 01:32

## 2022-07-18 ASSESSMENT — PAIN SCALES - GENERAL
PAINLEVEL_OUTOF10: 6
PAINLEVEL_OUTOF10: 2
PAINLEVEL_OUTOF10: 10
PAINLEVEL_OUTOF10: 9
PAINLEVEL_OUTOF10: 6
PAINLEVEL_OUTOF10: 6
PAINLEVEL_OUTOF10: 4
PAINLEVEL_OUTOF10: 6
PAINLEVEL_OUTOF10: 3
PAINLEVEL_OUTOF10: 6
PAINLEVEL_OUTOF10: 8
PAINLEVEL_OUTOF10: 7
PAINLEVEL_OUTOF10: 4
PAINLEVEL_OUTOF10: 9
PAINLEVEL_OUTOF10: 4
PAINLEVEL_OUTOF10: 10

## 2022-07-18 ASSESSMENT — ENCOUNTER SYMPTOMS
SHORTNESS OF BREATH: 0
NAUSEA: 1
BACK PAIN: 1
ABDOMINAL PAIN: 1
VOMITING: 1
DIARRHEA: 0
RHINORRHEA: 0
EYE PAIN: 0

## 2022-07-18 ASSESSMENT — PAIN DESCRIPTION - FREQUENCY
FREQUENCY: INTERMITTENT

## 2022-07-18 ASSESSMENT — PAIN DESCRIPTION - ONSET
ONSET: GRADUAL
ONSET: GRADUAL

## 2022-07-18 ASSESSMENT — PAIN DESCRIPTION - LOCATION
LOCATION: BACK;ABDOMEN
LOCATION: ABDOMEN
LOCATION: HEAD
LOCATION: ABDOMEN
LOCATION: BACK
LOCATION: ABDOMEN

## 2022-07-18 ASSESSMENT — PAIN DESCRIPTION - DESCRIPTORS
DESCRIPTORS: ACHING
DESCRIPTORS: DISCOMFORT
DESCRIPTORS: ACHING

## 2022-07-18 ASSESSMENT — PAIN DESCRIPTION - ORIENTATION
ORIENTATION: LOWER
ORIENTATION: LOWER;RIGHT;LEFT
ORIENTATION: LEFT;RIGHT;LOWER
ORIENTATION: LOWER
ORIENTATION: LOWER;RIGHT;LEFT
ORIENTATION: RIGHT
ORIENTATION: RIGHT

## 2022-07-18 ASSESSMENT — PAIN DESCRIPTION - PAIN TYPE
TYPE: SURGICAL PAIN
TYPE: ACUTE PAIN
TYPE: SURGICAL PAIN
TYPE: SURGICAL PAIN
TYPE: ACUTE PAIN
TYPE: SURGICAL PAIN

## 2022-07-18 ASSESSMENT — PAIN - FUNCTIONAL ASSESSMENT: PAIN_FUNCTIONAL_ASSESSMENT: 0-10

## 2022-07-18 NOTE — ANESTHESIA PRE PROCEDURE
Historical Provider, MD   Calcium 1500 MG TABS Take by mouth daily    Historical Provider, MD   Cholecalciferol (VITAMIN D3) 3000 UNITS TABS Take by mouth daily    Historical Provider, MD   aspirin 81 MG tablet Take 81 mg by mouth daily. Historical Provider, MD       Current medications:    Current Facility-Administered Medications   Medication Dose Route Frequency Provider Last Rate Last Admin    [facility] Hold] sodium chloride flush 0.9 % injection 5-40 mL  5-40 mL IntraVENous 2 times per day Gt Otero MD        [facility]] sodium chloride flush 0.9 % injection 10 mL  10 mL IntraVENous PRN Gt Otero MD        [facility]] 0.9 % sodium chloride infusion   IntraVENous PRN Gt Otero MD        [facility] Hold] ondansetron (ZOFRAN-ODT) disintegrating tablet 4 mg  4 mg Oral Q8H PRN Gt Otero MD        Or    [facility]] [facility]ansetron [facility] injection 4 mg  4 mg IntraVENous Q6H PRN Gt Otero MD   4 mg at 07/18/22 1119    [[facility] Hold] polyethylene glycol (GLYCOLAX) packet 17 g  17 g Oral Daily PRN Gt Otero MD        [facility]] acetaminophen (TYLENOL) tablet 650 mg  650 mg Oral Q6H PRN Gt Otero MD        Or    [facility]] acetaminophen (TYLENOL) suppository 650 mg  650 mg Rectal Q6H PRN Gt Otero MD        [facility]] 0.9 % sodium chloride infusion   IntraVENous Continuous Gt Otero  mL/hr at 07/18/22 1122 New Bag at 07/18/22 1122    [[facility] Hold] piperacillin-tazobactam (ZOSYN) 3,375 mg in dextrose 5 % 50 mL IVPB (mini-bag)  3,375 mg IntraVENous Abdirahman Akers MD   Stopped at 07/18/22 1200    [[facility] Hold] ketorolac (TORADOL) injection 15 mg  15 mg IntraVENous Q6H PRN Renetta Reyes MD           Allergies:     Allergies   Allergen Reactions    No Known Allergies        Problem List:    Patient Active Problem List   Diagnosis Code    Malignant neoplasm of left breast in female, estrogen receptor positive ([facility] C50.912, Z17.0    S/P left mastectomy Z90.12    Osteoporosis with pathological fracture of lumbar vertebra ([address] /   Kyphoplasty lumbar x3 2021 M80. 5XA    H/O malignant neoplasm of breast Z85.3    Disproportion of reconstructed breast N65.1    Essential hypertension I10    Chronic bilateral low back pain without sciatica M54.50, G89.29    Neuropathy associated with cancer ([address] / breast cancer 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, G63    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.6, Q66.219    Internal hemorrhoids K64.8    Appendicitis K37    Moderate malnutrition (HCC) E44.0       Past Medical History:        Diagnosis Date    Anxiety     Bone spur     ON SPINE-BACK PAIN    Breast cancer ([facility] [address] 2014    LEFT     Depression     Diverticulosis of large intestine without hemorrhage     Essential hypertension     Osteoarthritis     Seasonal allergies     Wears dentures     FULL UPPER/PARTIAL LOWER/INSTRUCTED NOT TO USE ADHESIVE    Wrist fracture 2005       Past Surgical History:        Procedure Laterality Date    BACK SURGERY      2020    BREAST BIOPSY  02/18/2014    Needle aspiration biopsy    BREAST RECONSTRUCTION  04/2015    Still in progress, initiated in April 2015   [facility] Bilateral     [facility]18[facility]    BUNIONECTOMY Left 11/08/2017    BUNIONECTOMY Left 11/08/2017    FOOT BUNIONECTOMY-OSTEOTOMY, [facility] performed by Giovany Olivia DPM at [facility]  2005    right    COLONOSCOPY  12/04/2017    -diverticulosis,hemorrhoids    MASTECTOMY Left 03/18/2014    with sentinel node biopsy    OTHER SURGICAL HISTORY  approx 2005    Tendon surgery (Right hand)    OTHER SURGICAL HISTORY      PORT REMOVAL    DE COLON CA SCRN NOT  W [address] [place] [address] N/A 12/04/2017    COLONOSCOPY performed by Gwen Wong MD at [address] [place] [address] Right 04/30/2014       Social History:    Social History     Tobacco Use    Smoking status: Former     Packs/day: 0.50     Years: 10.00     Pack years: 5.00     Types: Cigarettes     Quit date: 3/18/2014     Years since quittin.3    Smokeless tobacco: Never   Substance Use Topics    Alcohol use: Yes     Comment: Rarely                                Counseling given: Not Answered      Vital Signs (Current):   Vitals:    22 0625 22 0757 22 1158 22 1239   BP: 115/70  131/76 (!) 147/83   Pulse: 71  70 81   Resp:    Temp: 36.4 °C (97.6 °F)  36.1 °C (97 °F) 36.2 °C (97.1 °F)   TempSrc: Temporal  Temporal Temporal   SpO2: 96%  97% 97%   Weight:       Height:  5' 7\" (1.702 m)                                                BP Readings from Last 3 Encounters:   22 (!) 147/83   22 (!) 152/79   22 (!) 147/86       NPO Status: Time of last liquid consumption: 0                        Time of last solid consumption: 1800                        Date of last liquid consumption: 22                        Date of last solid food consumption: 22    BMI:   Wt Readings from Last 3 Encounters:   22 141 lb 8.6 oz (64.2 kg)   22 143 lb 9.6 oz (65.1 kg)   21 131 lb (59.4 kg)     Body mass index is 22.17 kg/m².     CBC:   Lab Results   Component Value Date/Time    WBC 16.2 2022 05:30 AM    RBC 3.88 2022 05:30 AM    HGB 12.2 2022 05:30 AM    HCT 37.3 2022 05:30 AM    MCV 96.1 2022 05:30 AM    RDW 13.4 2022 05:30 AM     2022 05:30 AM       CMP:   Lab Results   Component Value Date/Time     2022 05:30 AM    K 4.6 2022 05:30 AM     2022 05:30 AM    CO2 24 2022 05:30 AM    BUN 10 2022 05:30 AM    CREATININE 0.70 2022 05:30 AM    GFRAA >60 2022 05:30 AM    LABGLOM >60 2022 05:30 AM    GLUCOSE 115 2022 05:30 AM    PROT 6.0 2022 05:30 AM    CALCIUM 8.9 2022 05:30 AM    BILITOT 0.31 2022 05:30 AM    ALKPHOS 52 07/18/2022 05:30 AM    AST 13 07/18/2022 05:30 AM    ALT 13 07/18/2022 05:30 AM       POC Tests: No results for input(s): POCGLU, POCNA, POCK, POCCL, POCBUN, POCHEMO, POCHCT in the last 72 hours. Coags: No results found for: PROTIME, INR, APTT    HCG (If Applicable): No results found for: PREGTESTUR, PREGSERUM, HCG, HCGQUANT     ABGs: No results found for: PHART, PO2ART, NEW8TTM, VXR9BWH, BEART, F5PDTJCJ     Type & Screen (If Applicable):  No results found for: LABABO, LABRH    Drug/Infectious Status (If Applicable):  Lab Results   Component Value Date/Time    HEPCAB NONREACTIVE 12/05/2017 10:55 AM       COVID-19 Screening (If Applicable):   Lab Results   Component Value Date/Time    COVID19 negative 02/17/2021 12:00 AM           Anesthesia Evaluation  Patient summary reviewed and Nursing notes reviewed  Airway: Mallampati: II  TM distance: >3 FB   Neck ROM: full  Mouth opening: > = 3 FB   Dental:    (+) upper dentures and partials      Pulmonary:Negative Pulmonary ROS and normal exam                               Cardiovascular:  Exercise tolerance: good (>4 METS),   (+) hypertension: no interval change,                   Neuro/Psych:   (+) psychiatric history: stable without treatment            GI/Hepatic/Renal: Neg GI/Hepatic/Renal ROS            Endo/Other: Negative Endo/Other ROS                    Abdominal:             Vascular: negative vascular ROS. Other Findings:           Anesthesia Plan      general     ASA 2 - emergent           MIPS: Postoperative opioids intended and Prophylactic antiemetics administered. Anesthetic plan and risks discussed with patient.               Post-op pain plan if not by surgeon: single peripheral nerve block            KADY Wheeler CRNA   7/18/2022

## 2022-07-18 NOTE — CONSULTS
Consult Note  Subjective:      Patient ID: Magalys Neri is a 74 y.o. female who presents today for:  Chief Complaint   Patient presents with    Back Pain    Emesis     Pt here for chronic back pain, states this evening it is worsening, took tramadol with no relief, ended up vomiting from pain. GERARD Stein presented to the emergency room last evening with lower abdominal pain, back pain and associated nausea and vomiting. She denies fever. She denies previous history of similar type symptoms though she does suffer from chronic back pain. She denies change in bowel habits. She denies urinary symptoms.     Past Medical History:   Diagnosis Date    Anxiety     Bone spur     ON SPINE-BACK PAIN    Breast cancer ([facility] [address] 2014    LEFT     Depression     Diverticulosis of large intestine without hemorrhage     Essential hypertension     Osteoarthritis     Seasonal allergies     Wears dentures     FULL UPPER/PARTIAL LOWER/INSTRUCTED NOT TO USE ADHESIVE    Wrist fracture 2005       Past Surgical History:   Procedure Laterality Date    BACK SURGERY      2020    BREAST BIOPSY  02/18/2014    Needle aspiration biopsy    BREAST RECONSTRUCTION  04/2015    Still in progress, initiated in April 2015    BREAST RECONSTRUCTION Bilateral     BREAST SURGERY Left 03/18/2014     - [facility]    BUNIONECTOMY Left 11/08/2017    BUNIONECTOMY Left 11/08/2017    FOOT BUNIONECTOMY-OSTEOTOMY, [facility] performed by Jacqueline Mcarthur DPM at [address]  2005    right    COLONOSCOPY  12/04/2017    -diverticulosis,hemorrhoids    MASTECTOMY Left 03/18/2014    with sentinel node biopsy    OTHER SURGICAL HISTORY  approx 2005    Tendon surgery (Right hand)    OTHER SURGICAL HISTORY      PORT REMOVAL    [place] COLON CA SCRN NOT HI [facility] IND N/A 12/04/2017    COLONOSCOPY performed by Tootie Oshea MD at [address] Right 04/30/2014       Allergies   Allergen Reactions    No Known Allergies        No current facility-administered medications on file prior to encounter. Current Outpatient Medications on File Prior to Encounter   Medication Sig Dispense Refill    Denosumab (PROLIA SC) Inject into the skin every 6 months      acetaminophen (TYLENOL) 650 MG extended release tablet Take 650 mg by mouth every 8 hours as needed for Pain      ALPRAZolam (XANAX) 0.5 MG tablet Take 1 tablet by mouth nightly as needed for Sleep or Anxiety for up to 90 days. 90 tablet 0    traMADol (ULTRAM) 50 MG tablet Take 50 mg by mouth every 6 hours as needed for Pain. lisinopril (PRINIVIL;ZESTRIL) 5 MG tablet TAKE 1 TABLET BY MOUTH DAILY 90 tablet 3    DULoxetine (CYMBALTA) 30 MG extended release capsule Take 1 capsule by mouth daily (Patient not taking: No sig reported) 90 capsule 3    Doxylamine Succinate, Sleep, (SLEEP AID PO) Take by mouth nightly       diphenhydrAMINE HCl, Sleep, (ZZZQUIL PO) Take by mouth nightly       fluticasone (FLONASE) 50 MCG/ACT nasal spray 2 sprays by Each Nostril route daily 1 Bottle 2    prochlorperazine (COMPAZINE) 10 MG tablet TAKE 1 TABLET BY MOUTH EVERY 6 HOURS AS NEEDED FOR NAUSEA (Patient not taking: No sig reported) 60 tablet 1    b complex vitamins capsule Take 1 capsule by mouth daily       Dextromethorphan-Guaifenesin (MUCINEX DM)  MG TB12 Take 1 tablet by mouth daily as needed       fexofenadine (ALLEGRA) 180 MG tablet Take 180 mg by mouth daily       Calcium 1500 MG TABS Take by mouth daily      Cholecalciferol (VITAMIN D3) 3000 UNITS TABS Take by mouth daily      aspirin 81 MG tablet Take 81 mg by mouth daily. Objective:   Physical Examination:    BP (!) 147/83   Pulse 81   Temp 97.1 °F (36.2 °C) (Temporal)   Resp 19   Ht 5' 7\" (1.702 m)   Wt 141 lb 8.6 oz (64.2 kg)   LMP  (LMP Unknown)   SpO2 97%   BMI 22.17 kg/m²     Physical Exam  HENT:      Head: Normocephalic and atraumatic.    Eyes:      Extraocular Movements: Extraocular movements intact. Pupils: Pupils are equal, round, and reactive to light. Cardiovascular:      Rate and Rhythm: Normal rate and regular rhythm. Pulses: Normal pulses. Heart sounds: Normal heart sounds. No murmur heard. No friction rub. No gallop. Pulmonary:      Effort: Pulmonary effort is normal.      Breath sounds: Normal breath sounds. Abdominal:      General: Abdomen is flat. Bowel sounds are normal.      Palpations: Abdomen is soft. Tenderness: There is abdominal tenderness (RLQ). Musculoskeletal:         General: No swelling. Normal range of motion. Cervical back: Normal range of motion and neck supple. Neurological:      General: No focal deficit present. Mental Status: She is oriented to person, place, and time. Psychiatric:         Mood and Affect: Mood normal.         Behavior: Behavior normal.      White blood cell count is elevated to 16,000 with a left shift of 81% neutrophils. Hemoglobin is 12.2. CT of the abdomen and pelvis demonstrates uncomplicated acute appendicitis. 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           Leopold Jarvis, MD   7/18/2022 1:52 PM EDT

## 2022-07-18 NOTE — PROGRESS NOTES
Discharge Criteria    Inpatients must meet Criteria 1 through 7. All other patients are either YES or N/A. If a NO is chosen then Anesthesia or Surgeon must be notified. 1.  Minimum 30 minutes after last dose of sedative medication, minimum 120 minutes after last dose of reversal agent. Yes      2. Systolic BP stable within 20 mmHg for 30 minutes & systolic BP between 90 & 882 or within 10 mmHg of baseline. Yes      3. Pulse between 60 and 100 or within 10 bpm of baseline. Yes      4. Spontaneous respiratory rate >/= 10 per minute. Yes      5. SaO2 >/= 95 or  >/= baseline. Yes      6. Able to cough and swallow or return to baseline function. Yes      7. Alert and oriented or return to baseline mental status. Yes      8. Demonstrates controlled, coordinated movements, ambulates with steady gait, or return to baseline activity function. N/A      9. Minimal or no pain or nausea, or at a level tolerable and acceptable to patient. N/A      10. Takes and retains oral fluids as allowed. N/A      11. Procedural / perioperative site stable. Minimal or no bleeding. N/A          12. If GI endoscopy procedure, minimal or no abdominal distention or passing flatus. N/A      13. Written discharge instructions and emergency telephone number provided. N/A      14. Accompanied by a responsible adult.     N/A

## 2022-07-18 NOTE — PROGRESS NOTES
Received call from [address] [place] [address] at this time, per Emy Brand, surgery will be at 1330 and surgery will pick patient up at 1230. Patient aware.

## 2022-07-18 NOTE — PROGRESS NOTES
Shift assessment and vitals obtained at this time as charted. Vitals WNL, patient is complaining of 2 out of 10 pain. PRN toradol given. Patient is alert and oriented x4, assessment otherwise obtained as charted. Patient is resting in the bed, denies any needs at this time. Will continue to monitor.

## 2022-07-18 NOTE — H&P
HISTORY AND PHYSICAL                  HPI  Jose J Guzman presented to the emergency room last evening with lower abdominal pain, back pain and associated nausea and vomiting. She denies fever. She denies previous history of similar type symptoms though she does suffer from chronic back pain. She denies change in bowel habits. She denies urinary symptoms. Past Medical History        Past Medical History:   Diagnosis Date    Anxiety      Bone spur       ON SPINE-BACK PAIN    Breast cancer ([address] 2014     LEFT    Depression      Diverticulosis of large intestine without hemorrhage      Essential hypertension      Osteoarthritis      Seasonal allergies      Wears dentures       FULL UPPER/PARTIAL LOWER/INSTRUCTED NOT TO USE ADHESIVE    Wrist fracture 2005            Past Surgical History         Past Surgical History:   Procedure Laterality Date    BACK SURGERY         2020    BREAST BIOPSY   02/18/2014     Needle aspiration biopsy    BREAST RECONSTRUCTION   04/2015     Still in progress, initiated in April 2015    BREAST RECONSTRUCTION Bilateral      BREAST SURGERY Left 03/18/2014      - [facility]    BUNIONECTOMY Left 11/08/2017    BUNIONECTOMY Left 11/08/2017     FOOT BUNIONECTOMY-OSTEOTOMY, [facility] performed by Giovany Olivia DPM at [address]   2005     right    COLONOSCOPY   12/04/2017     -diverticulosis,hemorrhoids    MASTECTOMY Left 03/18/2014     with sentinel node biopsy    OTHER SURGICAL HISTORY   approx 2005     Tendon surgery (Right hand)    OTHER SURGICAL HISTORY         PORT REMOVAL    [place] COLON CA SCRN NOT  [address] IND N/A 12/04/2017     COLONOSCOPY performed by Gwen Wong MD at [address] Right 04/30/2014                 Allergies   Allergen Reactions    No Known Allergies           No current facility-administered medications on file prior to encounter.              Current Outpatient Medications on File Prior to Encounter   Medication Sig Dispense Refill    Denosumab (PROLIA SC) Inject into the skin every 6 months        acetaminophen (TYLENOL) 650 MG extended release tablet Take 650 mg by mouth every 8 hours as needed for Pain        ALPRAZolam (XANAX) 0.5 MG tablet Take 1 tablet by mouth nightly as needed for Sleep or Anxiety for up to 90 days. 90 tablet 0    traMADol (ULTRAM) 50 MG tablet Take 50 mg by mouth every 6 hours as needed for Pain. lisinopril (PRINIVIL;ZESTRIL) 5 MG tablet TAKE 1 TABLET BY MOUTH DAILY 90 tablet 3    DULoxetine (CYMBALTA) 30 MG extended release capsule Take 1 capsule by mouth daily (Patient not taking: No sig reported) 90 capsule 3    Doxylamine Succinate, Sleep, (SLEEP AID PO) Take by mouth nightly        diphenhydrAMINE HCl, Sleep, (ZZZQUIL PO) Take by mouth nightly        fluticasone (FLONASE) 50 MCG/ACT nasal spray 2 sprays by Each Nostril route daily 1 Bottle 2    prochlorperazine (COMPAZINE) 10 MG tablet TAKE 1 TABLET BY MOUTH EVERY 6 HOURS AS NEEDED FOR NAUSEA (Patient not taking: No sig reported) 60 tablet 1    b complex vitamins capsule Take 1 capsule by mouth daily        Dextromethorphan-Guaifenesin (MUCINEX DM)  MG TB12 Take 1 tablet by mouth daily as needed        fexofenadine (ALLEGRA) 180 MG tablet Take 180 mg by mouth daily        Calcium 1500 MG TABS Take by mouth daily        Cholecalciferol (VITAMIN D3) 3000 UNITS TABS Take by mouth daily        aspirin 81 MG tablet Take 81 mg by mouth daily. Objective:   Physical Examination:    BP (!) 147/83   Pulse 81   Temp 97.1 °F (36.2 °C) (Temporal)   Resp 19   Ht 5' 7\" (1.702 m)   Wt 141 lb 8.6 oz (64.2 kg)   LMP  (LMP Unknown)   SpO2 97%   BMI 22.17 kg/m²      Physical Exam  HENT:     Head: Normocephalic and atraumatic. Eyes:     Extraocular Movements: Extraocular movements intact. Pupils: Pupils are equal, round, and reactive to light.   Cardiovascular:     Rate and Rhythm: Normal rate and regular rhythm. Pulses: Normal pulses. Heart sounds: Normal heart sounds. No murmur heard. No friction rub. No gallop. Pulmonary:     Effort: Pulmonary effort is normal.     Breath sounds: Normal breath sounds. Abdominal:     General: Abdomen is flat. Bowel sounds are normal.     Palpations: Abdomen is soft. Tenderness: There is abdominal tenderness (RLQ). Musculoskeletal:         General: No swelling. Normal range of motion. Cervical back: Normal range of motion and neck supple. Neurological:     General: No focal deficit present. Mental Status: She is oriented to person, place, and time. Psychiatric:         Mood and Affect: Mood normal.         Behavior: Behavior normal.     White blood cell count is elevated to 16,000 with a left shift of 81% neutrophils. Hemoglobin is 12.2. CT of the abdomen and pelvis demonstrates uncomplicated acute appendicitis. 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

## 2022-07-18 NOTE — PROGRESS NOTES
Reassessment and vitals obtained at this time as charted. Blood pressure slightly elevated, vitals otherwise WNL. Patient is complaining of 6 out of 10 pain in her abdomen, will give PRN oxycodone shortly. Patient is alert and oriented x4. Dressings to abdominal incisions x3 are clean, dry, and intact with no drainage. Patient is resting in the bed, denies any other needs at this time. Will continue to monitor.

## 2022-07-18 NOTE — ANESTHESIA POSTPROCEDURE EVALUATION
Department of Anesthesiology  Postprocedure Note    Patient: Aby Da Silva  MRN: [id number]  YOB: 1955  Date of evaluation: 7/18/2022      Procedure Summary     Date: 07/18/22 Room / Location: [address]    Anesthesia Start: 8671 Anesthesia Stop: 2388    Procedure: APPENDECTOMY LAPAROSCOPIC (Abdomen) Diagnosis:       Appendicitis, unspecified appendicitis type      (appecdicitis)    Surgeons: Henrry Ma MD Responsible Provider: Patel Dwyer. KADY Payne - CRNA    Anesthesia Type: general ASA Status: 2 - Emergent          Anesthesia Type: No value filed.     Leann Phase I: Leann Score: 9    Leann Phase II:        Anesthesia Post Evaluation    Patient location during evaluation: PACU  Patient participation: complete - patient participated  Level of consciousness: awake and alert  Pain score: 4  Airway patency: patent  Nausea & Vomiting: no vomiting and no nausea  Complications: no  Cardiovascular status: blood pressure returned to baseline  Respiratory status: acceptable  Hydration status: stable  Multimodal analgesia pain management approach

## 2022-07-18 NOTE — ED PROVIDER NOTES
[facility] [place] ED  EMERGENCY DEPARTMENT ENCOUNTER      Pt Name: Tasia Starr  MRN: [id number]  [place] 1955  Date of evaluation: 7/17/2022  Provider: Brant Desai MD     [address] [place] [address]       Chief Complaint   Patient presents with    Back Pain    Emesis     Pt here for chronic back pain, states this evening it is worsening, took tramadol with no relief, ended up vomiting from pain. HISTORY OF PRESENT ILLNESS   (Location/Symptom, Timing/Onset, Context/Setting, Quality, Duration, Modifying Factors, Severity) Note limiting factors. I wore a surgical mask for the entirety of this encounter. HPI    Tasia Starr is a 74 y.o. female past medical history significant for osteoporosis, breast cancer status postmastectomy, hypertension who presents to the emergency department acute exacerbation of back pain with abdominal pain. Patient states pain started in the back initially she thought it was her usual back pain. She states however it persisted and then radiated to the abdomen now in the right lower quadrant. She states that pain is accompanied by nausea with 1 episode of emesis. Patient states pain is approximately an 8 out of 10. She states pain was not relieved with tramadol that she took at home. She denies fevers, chills, trauma to the back, chest pain, shortness of breath, hematemesis, diarrhea, hematochezia or urinary symptoms. Patient also denies joan pain. Nursing Notes were reviewed. REVIEW OF SYSTEMS    (2+ for level 4; 10+ for level 5)   Review of Systems   Constitutional:  Negative for activity change, chills, diaphoresis, fever and unexpected weight change. HENT:  Negative for congestion and rhinorrhea. Eyes:  Negative for pain and visual disturbance. Respiratory:  Negative for shortness of breath. Cardiovascular:  Negative for chest pain and palpitations. Gastrointestinal:  Positive for abdominal pain, nausea and vomiting. Negative for diarrhea. Genitourinary:  Negative for decreased urine volume, dysuria, flank pain, frequency and hematuria. Musculoskeletal:  Positive for back pain. Negative for arthralgias and myalgias. Skin:  Negative for wound. Neurological:  Negative for weakness and light-headedness. Psychiatric/Behavioral:  Negative for confusion.       PAST MEDICAL HISTORY     Past Medical History:   Diagnosis Date    Anxiety     Bone spur     ON SPINE-BACK PAIN    Breast cancer ([address] 2014    LEFT     Depression     Diverticulosis of large intestine without hemorrhage     Essential hypertension     Osteoarthritis     Seasonal allergies     Wears dentures     FULL UPPER/PARTIAL LOWER/INSTRUCTED NOT TO USE ADHESIVE    Wrist fracture 2005       SURGICAL HISTORY       Past Surgical History:   Procedure Laterality Date    BACK SURGERY      2020    BREAST BIOPSY  02/18/2014    Needle aspiration biopsy    BREAST RECONSTRUCTION  04/2015    Still in progress, initiated in April 2015    BREAST RECONSTRUCTION Bilateral     BREAST SURGERY Left 03/18/2014     - [facility]    BUNIONECTOMY Left 11/08/2017    BUNIONECTOMY Left 11/08/2017    FOOT BUNIONECTOMY-OSTEOTOMY, [facility] performed by Philip Tenorio DPM at [address]  2005    right    COLONOSCOPY  12/04/2017    -diverticulosis,hemorrhoids    MASTECTOMY Left 03/18/2014    with sentinel node biopsy    OTHER SURGICAL HISTORY  approx 2005    Tendon surgery (Right hand)    OTHER SURGICAL HISTORY      PORT REMOVAL    MN COLON CA SCRN NOT HI RSK IND N/A 12/04/2017    COLONOSCOPY performed by Rafal Jasmine MD at [address] [facility] [address] Right 04/30/2014       CURRENT MEDICATIONS       Previous Medications    ACETAMINOPHEN (TYLENOL 8 HOUR ARTHRITIS PAIN) 650 MG EXTENDED RELEASE TABLET    Take 650 mg by mouth every 8 hours as needed for Pain    ALPRAZOLAM (XANAX) 0.5 MG TABLET    Take 1 tablet by mouth nightly as needed for Sleep or Anxiety for up to 90 days. ASPIRIN 81 MG TABLET    Take 81 mg by mouth daily. B COMPLEX VITAMINS CAPSULE    Take 1 capsule by mouth daily     CALCIUM 1500 MG TABS    Take by mouth daily    CHOLECALCIFEROL (VITAMIN D3) 3000 UNITS TABS    Take by mouth daily    DEXTROMETHORPHAN-GUAIFENESIN (MUCINEX DM)  MG TB12    Take 1 tablet by mouth daily as needed     DIPHENHYDRAMINE HCL, SLEEP, (ZZZQUIL PO)    Take by mouth nightly     DOXYLAMINE SUCCINATE, SLEEP, (SLEEP AID PO)    Take by mouth nightly     DULOXETINE (CYMBALTA) 30 MG EXTENDED RELEASE CAPSULE    Take 1 capsule by mouth daily    FEXOFENADINE (ALLEGRA) 180 MG TABLET    Take 180 mg by mouth daily     FLUTICASONE (FLONASE) 50 MCG/ACT NASAL SPRAY    2 sprays by Each Nostril route daily    LISINOPRIL (PRINIVIL;ZESTRIL) 5 MG TABLET    TAKE 1 TABLET BY MOUTH DAILY    PROCHLORPERAZINE (COMPAZINE) 10 MG TABLET    TAKE 1 TABLET BY MOUTH EVERY 6 HOURS AS NEEDED FOR NAUSEA    TRAMADOL (ULTRAM) 50 MG TABLET    Take 50 mg by mouth every 6 hours as needed for Pain.        ALLERGIES     No known allergies    FAMILY HISTORY       Family History   Problem Relation Age of Onset    Cancer Father         lung        SOCIAL HISTORY       Social History     Socioeconomic History    Marital status:    Tobacco Use    Smoking status: Former     Packs/day: 0.50     Years: 10.00     Pack years: 5.00     Types: Cigarettes     Quit date: 3/18/2014     Years since quittin.3    Smokeless tobacco: Never   Vaping Use    Vaping Use: Never used   Substance and Sexual Activity    Alcohol use: Yes     Comment: Rarely    Drug use: No     Social Determinants of Health     Financial Resource Strain: Low Risk     Difficulty of Paying Living Expenses: Not hard at all   Food Insecurity: No Food Insecurity    Worried About Running Out of Food in the Last Year: Never true    Ran Out of Food in the Last Year: Never true   Transportation Needs: No Transportation Needs    Lack of Transportation (Medical): No    Lack of Transportation (Non-Medical): No   Physical Activity: Insufficiently Active    Days of Exercise per Week: 3 days    Minutes of Exercise per Session: 30 min       SCREENINGS    Milnor Coma Scale  Eye Opening: Spontaneous  Best Verbal Response: Oriented  Best Motor Response: Obeys commands  Anamaria Coma Scale Score: 15      PHYSICAL EXAM    (up to 7 for level 4, 8 or more for level 5)     ED Triage Vitals [07/17/22 2320]   BP Temp Temp Source Heart Rate Resp SpO2 Height Weight   (!) 165/97 96.9 °F (36.1 °C) Tympanic 86 20 97 % 5' 7\" (1.702 m) 145 lb (65.8 kg)       Physical Exam  Vitals and nursing note reviewed. Constitutional:       General: She is not in acute distress. Appearance: She is not ill-appearing or toxic-appearing. HENT:      Head: Normocephalic and atraumatic. Right Ear: External ear normal.      Left Ear: External ear normal.      Nose: No congestion or rhinorrhea. Mouth/Throat:      Mouth: Mucous membranes are moist.      Pharynx: No oropharyngeal exudate. Eyes:      General: No scleral icterus. Right eye: No discharge. Left eye: No discharge. Conjunctiva/sclera: Conjunctivae normal.   Cardiovascular:      Rate and Rhythm: Normal rate. Pulses: Normal pulses. Heart sounds: No murmur heard. No gallop. Pulmonary:      Effort: Pulmonary effort is normal. No respiratory distress. Breath sounds: Normal breath sounds. No stridor. No wheezing or rhonchi. Abdominal:      General: Abdomen is flat. There is no distension. Palpations: Abdomen is soft. Tenderness: There is abdominal tenderness (Periumbilical and right lower quadrant). There is guarding (Right lower quadrant). Musculoskeletal:         General: Tenderness (Mid back that she states is chronic and unchanged.) present. No swelling or deformity. Cervical back: Normal range of motion and neck supple. Skin:     General: Skin is warm.       Coloration: Skin is not jaundiced. Findings: No bruising or rash. Neurological:      General: No focal deficit present. Mental Status: She is alert and oriented to person, place, and time. Psychiatric:         Mood and Affect: Mood normal.         Behavior: Behavior normal.         Thought Content: Thought content normal.       DIAGNOSTIC RESULTS     Interpretation per the Radiologist below, if available at the time of this note:  CT ABDOMEN PELVIS W IV CONTRAST Additional Contrast? None    Result Date: 7/18/2022  EXAMINATION: CT OF THE ABDOMEN AND PELVIS WITH CONTRAST 7/18/2022 12:24 am TECHNIQUE: CT of the abdomen and pelvis was performed with the administration of intravenous contrast. Multiplanar reformatted images are provided for review. Automated exposure control, iterative reconstruction, and/or weight based adjustment of the mA/kV was utilized to reduce the radiation dose to as low as reasonably achievable. COMPARISON: None. HISTORY: ORDERING SYSTEM PROVIDED HISTORY: RLQ pain with nausea and guarding TECHNOLOGIST PROVIDED HISTORY: RLQ pain with nausea and guarding Decision Support Exception - unselect if not a suspected or confirmed emergency medical condition->Emergency Medical Condition (MA) FINDINGS: Lower Chest: Left mastectomy and reconstructive surgery identified. Lung bases are clear Organs: The liver, spleen, adrenal glands, kidneys, pancreas, gallbladder are unremarkable. GI/Bowel: Wall thickening involving the gastric antrum could relate to underdistention versus gastritis. Otherwise, there is no evidence of bowel obstruction. Mild retained stool. Colonic diverticulosis. Appendix is dilated fluid-filled and with surrounding inflammatory changes identified consistent with acute appendicitis. Pelvis: Bladder unremarkable. Uterus unremarkable. No suspicious adnexal mass. Peritoneum/Retroperitoneum: Aortic vascular calcifications. Aorta is nonaneurysmal.  No free air or free fluid. Bones/Soft Tissues: Multilevel compression fracture status post kyphoplasty involving the lumbar spine. Acute uncomplicated appendicitis. Recommend surgical consultation. ED BEDSIDE ULTRASOUND:   Performed by ED Physician - none    LABS:  Labs Reviewed   BASIC METABOLIC PANEL - Abnormal; Notable for the following components:       Result Value    Glucose 130 (*)     Anion Gap 7 (*)     All other components within normal limits   CBC WITH AUTO DIFFERENTIAL - Abnormal; Notable for the following components:    WBC 18.4 (*)     Seg Neutrophils 82 (*)     Lymphocytes 11 (*)     Segs Absolute 14.94 (*)     All other components within normal limits   HEPATIC FUNCTION PANEL - Abnormal; Notable for the following components: Total Bilirubin 0.25 (*)     All other components within normal limits   LACTIC ACID   URINALYSIS   LIPASE        All other labs were within normal range or not returned as of this dictation. EMERGENCY DEPARTMENT COURSE and DIFFERENTIAL DIAGNOSIS/MDM:   Vitals:    Vitals:    07/17/22 2320   BP: (!) 165/97   Pulse: 86   Resp: 20   Temp: 96.9 °F (36.1 °C)   TempSrc: Tympanic   SpO2: 97%   Weight: 145 lb (65.8 kg)   Height: 5' 7\" (1.702 m)       Medications   piperacillin-tazobactam (ZOSYN) 4,500 mg in dextrose 5 % 100 mL IVPB (mini-bag) (has no administration in time range)   ketorolac (TORADOL) injection 15 mg (15 mg IntraVENous Given 7/17/22 2347)   ondansetron (ZOFRAN) injection 4 mg (4 mg IntraVENous Given 7/17/22 2346)   0.9 % sodium chloride bolus (1,000 mLs IntraVENous New Bag 7/17/22 2347)   iopamidol (ISOVUE-370) 76 % injection 75 mL (75 mLs IntraVENous Given 7/18/22 0024)       MDM  Patient presenting for evaluation for abdominal pain with nausea and vomiting that initially originated in her back. Presentation is concerning for kidney stones versus UTI versus appendicitis versus diverticulitis versus acute exacerbation of back pain.   Patient likely has an aortic dissection or cardiac related origin of symptoms. Work-up in the department with no electrolyte abnormalities, no renal function impairment, no transaminitis, no pancreatitis, no lactic acidosis, no UTI, with leukocytosis blood count of 18.4 with a left shift, no anemia, and a CT of the abdomen pelvis with appendicitis with no perforation or abscesses. Even these findings patient was started on Zosyn. She was discussed with Dr Evan Salvador with general surgery who recommended medical admission for surgical consult in the morning appendicitis. Patient received Toradol, IV fluids, and Zofran in the emergency department with some improvement of symptoms. Discussed lab and imaging results with the patient. Discussed with the patient plan for admission, antibiotics, and surgical evaluation in the morning. Patient verbalized understanding for information given and agreed to plan. Patient was discussed with Dr. Arnel Joseph with internal medicine who accepted patient for admission. Admitted in stable condition. REVAL:     Remained hemodynamically stable in the emergency department with symptoms well controlled. Patient NPO. CRITICAL CARE TIME   Total Critical Care time was 25 minutes, excluding separately reportable procedures. There was a high probability of clinically significant/life threatening deterioration in the patient's condition which required my urgent intervention. CONSULTS:  None    PROCEDURES:  Unless otherwise noted below, none     Procedures    FINAL IMPRESSION      1. Acute appendicitis with localized peritonitis, without perforation, abscess, or gangrene          DISPOSITION/PLAN   DISPOSITION Admitted 07/18/2022 01:31:19 AM      PATIENT REFERRED TO:  No follow-up provider specified.     DISCHARGE MEDICATIONS:  New Prescriptions    No medications on file          (Please note:  Portions of this note were completed with a voice recognition program.  Efforts were made to edit the dictations but occasionally words and phrases are mis-transcribed.)  Form v2016. J.5-cn    Stephany eRyes MD (electronically signed)  Emergency Medicine Provider       Stephany Reyes MD  07/18/22 9716

## 2022-07-18 NOTE — PROGRESS NOTES
Writer measured patients legs for Hanane at this time, however, the size patient needs is not available on [facility]. Writer called surgery and they have the right size, so surgery will put Hanane on when she goes to surgery this afternoon.

## 2022-07-18 NOTE — PROGRESS NOTES
Patient admitted to [facility] floor room 330 via wheelchair and ambulated to bed. Vitals and assessment completed along with navigator at this time as charted. Patient is NPO and extension tubing on. Patient states no needs at this time, her call light is in reach, will continue to monitor.

## 2022-07-18 NOTE — OP NOTE
OPERATIVE NOTE    DATE OF PROCEDURE: 7/18/2022     SURGEON: Uzair Jordan MD     ASSISTANT: None    PREOPERATIVE DIAGNOSIS: Acute appendicitis    POSTOPERATIVE DIAGNOSIS: Same    OPERATION: Laparoscopic appendectomy    ANESTHESIA: General    ESTIMATED BLOOD LOSS: 20 cc    COMPLICATIONS: None     SPECIMENS:   ID Type Source Tests Collected by Time Destination   A :  Tissue Appendix SURGICAL PATHOLOGY Uzair Jordan MD 7/18/2022 5232         HISTORY: The patient is a 74y.o. year old female with history of above preop diagnosis. I explained the risk, benefits, expected outcome, and alternatives to the procedure. Patient understands and is in agreement. PROCEDURE: The patient is brought to the operating room. She is placed in supine position. Inhalation anesthesia is induced. The abdomen is prepared and draped in standard fashion. Supra umbilical midline incision is made with a scalpel blade. Access is gained to the peritoneal cavity in standard fashion.  Rosy port is placed and peritoneal insufflation is obtained. The patient was placed headdown right side up. 2 additional 5 mm ports were placed under direct vision in the left lower abdomen. The appendix is located and elevated. There are clear signs of acute inflammation. A window was created between the appendix and mesoappendix near its base. An endoscopic JAMAR stapling device was then fired across the mesoappendix and then across the base of the appendix at the cecum. The specimen is placed in an Endo Catch. Hemostasis is confirmed. The specimen is removed in the Endo Catch. Air is released from the peritoneal cavity. Fascia at the supraumbilical port site is closed using 0 Vicryl suture. Skin incisions are closed using 4-0 Monocryl. Dressings were applied. The patient is awakened and returned to the recovery room. The patient was sent to PACU in good condition.      Electronically signed by Uzair Jordan MD on 7/18/22 at 2:46

## 2022-07-18 NOTE — PROGRESS NOTES
Comprehensive Nutrition Assessment    Type and Reason for Visit:  Initial    Nutrition Recommendations/Plan:   Encouraged fluid, as medically appropriate   Encourage protein-energy intake, as medically appropriate  Recommend lactose-free milk products      Malnutrition Assessment:  Malnutrition Status: Moderate malnutrition (Decrease in appetite, weight loss of 1.3% in 4 days, muscle loss) (07/18/22 0837)    Context:  Acute Illness     Findings of the 6 clinical characteristics of malnutrition:  Energy Intake:  Mild decrease in energy intake (Comment) (During summer season)  Weight Loss:  1% to 2% over 1 week (1.3% over 4 days, vomiting)     Body Fat Loss:  Unable to assess     Muscle Mass Loss:  Mild muscle mass loss Temples (temporalis)  Fluid Accumulation:  No significant fluid accumulation     Strength:  Not Performed    Nutrition Assessment:    Moderate malnutrition related to inadequate protein energy intake as evidenced by poor appetite, mild muslce loss around temples, and weight loss of 1.3% in 4 days. Pt stated that her appetite was not good during the summer months, but tries to force herself to eat. She also stated that she was very hungry. At home, patient adds no salt to foods and tries to include fruits and vegetables in her meals. She also stated that she consumes a lot of water. Pt reported not drinking milk because it gave het stomach distress. Pt noted that she really enjoys yogurt. Pt stated that she has always struggled to gain weight and keep it on. Prior to admission she had weighed herself and gotten to 145# (date unspecified) and began losing once admitted. Pt stated that the quick loss was not an abnormal occurence for her, as she often has fluctuations. Pt had visible signs of hair loss, no noted edema. Pt reported that she started taking biotin to help with the hair loss, and also takes ensure supplements at home.  Lab showed elevated glucose levels(1130-115) , elevated WBC (18.4-16.2) and low total protein (6.0). Pt was at moderate nutrition risk due to advancing age and poor appetite. Nutrition Related Findings:    No noted edema Wound Type: None       Current Nutrition Intake & Therapies:    Average Meal Intake: NPO  Average Supplements Intake: None Ordered  Diet NPO    Anthropometric Measures:  Height: 5' 7\" (170.2 cm)  Ideal Body Weight (IBW): 135 lbs (61 kg)    Admission Body Weight: 145 lb (65.8 kg)  Current Body Weight: 141 lb 8.6 oz (64.2 kg), 104.8 % IBW.  Weight Source: Bed Scale  Current BMI (kg/m2): 22.2  Usual Body Weight: 142 lb (64.4 kg)  % Weight Change (Calculated): -0.3  Weight Adjustment For: No Adjustment                 BMI Categories: Normal Weight (BMI 22.0 to 24.9) age over 72    Estimated Daily Nutrient Needs:  Energy Requirements Based On: Kcal/kg  Weight Used for Energy Requirements: Current  Energy (kcal/day): 9859-3833 (26-29)  Weight Used for Protein Requirements: Ideal  Protein (g/day): 74-80 (1.2-1.3)  Method Used for Fluid Requirements: 1 ml/kcal  Fluid (ml/day): 2000    Nutrition Diagnosis:   Moderate malnutrition related to inadequate protein-energy intake as evidenced by weight loss 1%-2% in 1 week, mild muscle loss, other (comment) (around temples and poor appetite)  Lab Results   Component Value Date     07/18/2022    K 4.6 07/18/2022     07/18/2022    CO2 24 07/18/2022    BUN 10 07/18/2022    CREATININE 0.70 07/18/2022    GLUCOSE 115 (H) 07/18/2022    CALCIUM 8.9 07/18/2022    PROT 6.0 (L) 07/18/2022    LABALBU 3.6 07/18/2022    BILITOT 0.31 07/18/2022    ALKPHOS 52 07/18/2022    AST 13 07/18/2022    ALT 13 07/18/2022    LABGLOM >60 07/18/2022    GFRAA >60 07/18/2022       No results found for: LABA1C  No results found for: EAG  No results found for: VITD25    Nutrition Interventions:   Food and/or Nutrient Delivery: Continue NPO  Nutrition Education/Counseling: Education initiated  Coordination of Nutrition Care: Continue to monitor while inpatient  Plan of Care discussed with: Patient    Goals:     Goals: Meet at least 75% of estimated needs, by next RD assessment       Nutrition Monitoring and Evaluation:   Behavioral-Environmental Outcomes: None Identified  Food/Nutrient Intake Outcomes: Food and Nutrient Intake  Physical Signs/Symptoms Outcomes: Biochemical Data, Weight    Discharge Planning:     Too soon to determine     [id number] [address]: [id number]

## 2022-07-18 NOTE — PROGRESS NOTES
Received call back from Dr. Alexander Rollins at this time, per Dr. Alexander Rollins, keep patient NPO and he will call the supervisor to get the patient on the surgery schedule for later today. Order also received for toradol 15 mg every 6 hours PRN. Will update patient.

## 2022-07-18 NOTE — PROGRESS NOTES
Patient arrived back on floor at this time, report received at bedside from surgery RN. Vitals obtained. Patient is resting comfortable with family at bedside, will continue to monitor.

## 2022-07-18 NOTE — H&P
History and Physical    Patient:  Thais Clements  MRN: [id number]    Chief Complaint:  abdominal pain    History Obtained From:  patient, electronic medical record    PCP: Zainab Jaramillo MD    History of Present Illness: The patient is a 74 y.o. female who presented to the emergency room with complaints of back pain and vomiting. Patient stated she has history of chronic back pain. Patient also has history of hypertension and history of breast cancer with history of mastectomy. Patient stated that her pain in her back initially she thought was her chronic pain she stated that it was persistent and radiated into her right lower quadrant of her abdomen. She complained of nausea with 1 episode of vomiting. She rated her pain an 8 out of 10 initially. Patient stated she tried to take tramadol at home however had no relief. She denied fever or chills. She denied injury including falls. She denied hemoptysis or hematic emesis. She denied diarrhea constipation. Denied dysuria hematuria. CT abdomen and pelvis was completed and showed acute uncomplicated appendicitis. Patient's WBC count was elevated to 18.4. Patient was started on IV fluids as well as IV Zosyn and was given a fluid bolus while in ER.     Past Medical History:        Diagnosis Date    Anxiety     Bone spur     ON SPINE-BACK PAIN    Breast cancer ([address] 2014    LEFT     Depression     Diverticulosis of large intestine without hemorrhage     Essential hypertension     Osteoarthritis     Seasonal allergies     Wears dentures     FULL UPPER/PARTIAL LOWER/INSTRUCTED NOT TO USE ADHESIVE    Wrist fracture 2005       Past Surgical History:        Procedure Laterality Date    BACK SURGERY      2020    BREAST BIOPSY  02/18/2014    Needle aspiration biopsy    BREAST RECONSTRUCTION  04/2015    Still in progress, initiated in April 2015    BREAST RECONSTRUCTION Bilateral     BREAST SURGERY Left 03/18/2014     - 's    BUNIONECTOMY Left 11/08/2017    BUNIONECTOMY Left 11/08/2017    FOOT BUNIONECTOMY-OSTEOTOMY, HALLUX VALGUS REPAIR performed by Daniela Baldwin DPM at [address]  2005    right    COLONOSCOPY  12/04/2017    -diverticulosis,hemorrhoids    MASTECTOMY Left 03/18/2014    with sentinel node biopsy    OTHER SURGICAL HISTORY  approx 2005    Tendon surgery (Right hand)    OTHER SURGICAL HISTORY      PORT REMOVAL    MT COLON CA SCRN NOT  [address] IND N/A 12/04/2017    COLONOSCOPY performed by Ashia Ness MD at [place] [address] Right 04/30/2014       Medications Prior to Admission:    Prior to Admission medications    Medication Sig Start Date End Date Taking? Authorizing Provider   acetaminophen (TYLENOL) 650 MG extended release tablet Take 650 mg by mouth every 8 hours as needed for Pain    Historical Provider, MD   ALPRAZolam (XANAX) 0.5 MG tablet Take 1 tablet by mouth nightly as needed for Sleep or Anxiety for up to 90 days. 5/16/22 8/14/22  Migel Mccann MD   traMADol (ULTRAM) 50 MG tablet Take 50 mg by mouth every 6 hours as needed for Pain.     Historical Provider, MD   lisinopril (PRINIVIL;ZESTRIL) 5 MG tablet TAKE 1 TABLET BY MOUTH DAILY 10/20/21   Migel Mccann MD   DULoxetine (CYMBALTA) 30 MG extended release capsule Take 1 capsule by mouth daily  Patient not taking: No sig reported 8/4/21   Migel Mccann MD   Doxylamine Succinate, Sleep, (SLEEP AID PO) Take by mouth nightly     Historical Provider, MD   diphenhydrAMINE HCl, Sleep, (ZZZQUIL PO) Take by mouth nightly     Historical Provider, MD   fluticasone (FLONASE) 50 MCG/ACT nasal spray 2 sprays by Each Nostril route daily 12/12/19   ANASTASIYA French   prochlorperazine (COMPAZINE) 10 MG tablet TAKE 1 TABLET BY MOUTH EVERY 6 HOURS AS NEEDED FOR NAUSEA  Patient not taking: No sig reported 5/8/19   Douglas2 Daniel Galeana MD   b complex vitamins capsule Take 1 capsule by mouth daily     Historical Provider, MD   Dextromethorphan-Guaifenesin (MUCINEX DM)  MG TB12 Take 1 tablet by mouth daily as needed     Historical Provider, MD   fexofenadine (ALLEGRA) 180 MG tablet Take 180 mg by mouth daily     Historical Provider, MD   Calcium 1500 MG TABS Take by mouth daily    Historical Provider, MD   Cholecalciferol (VITAMIN D3) 3000 UNITS TABS Take by mouth daily    Historical Provider, MD   aspirin 81 MG tablet Take 81 mg by mouth daily. Historical Provider, MD       Allergies:  No known allergies    Social History:   TOBACCO:   reports that she quit smoking about 8 years ago. Her smoking use included cigarettes. She has a 5.00 pack-year smoking history. She has never used smokeless tobacco.  ETOH:   reports current alcohol use. Family History:       Problem Relation Age of Onset    Cancer Father         lung       Allergies:  No known allergies    Medications Prior to Admission:    Prior to Admission medications    Medication Sig Start Date End Date Taking? Authorizing Provider   acetaminophen (TYLENOL) 650 MG extended release tablet Take 650 mg by mouth every 8 hours as needed for Pain    Historical Provider, MD   ALPRAZolam (XANAX) 0.5 MG tablet Take 1 tablet by mouth nightly as needed for Sleep or Anxiety for up to 90 days. 5/16/22 8/14/22  Luz Palomino MD   traMADol (ULTRAM) 50 MG tablet Take 50 mg by mouth every 6 hours as needed for Pain.     Historical Provider, MD   lisinopril (PRINIVIL;ZESTRIL) 5 MG tablet TAKE 1 TABLET BY MOUTH DAILY 10/20/21   Luz Palomino MD   DULoxetine (CYMBALTA) 30 MG extended release capsule Take 1 capsule by mouth daily  Patient not taking: No sig reported 8/4/21   Luz Palomino MD   Doxylamine Succinate, Sleep, (SLEEP AID PO) Take by mouth nightly     Historical Provider, MD   diphenhydrAMINE HCl, Sleep, (ZZZQUIL PO) Take by mouth nightly     Historical Provider, MD   fluticasone (FLONASE) 50 MCG/ACT nasal spray 2 sprays by Each Nostril route daily 12/12/19 ANASTASIYA French   prochlorperazine (COMPAZINE) 10 MG tablet TAKE 1 TABLET BY MOUTH EVERY 6 HOURS AS NEEDED FOR NAUSEA  Patient not taking: No sig reported 5/8/19   Stephan Leonard MD   b complex vitamins capsule Take 1 capsule by mouth daily     Historical Provider, MD   Dextromethorphan-Guaifenesin (MUCINEX DM)  MG TB12 Take 1 tablet by mouth daily as needed     Historical Provider, MD   fexofenadine (ALLEGRA) 180 MG tablet Take 180 mg by mouth daily     Historical Provider, MD   Calcium 1500 MG TABS Take by mouth daily    Historical Provider, MD   Cholecalciferol (VITAMIN D3) 3000 UNITS TABS Take by mouth daily    Historical Provider, MD   aspirin 81 MG tablet Take 81 mg by mouth daily. Historical Provider, MD       Review of Systems:  Constitutional:negative  for fevers, and negative for chills. Eyes: negative for visual disturbance   ENT: negative for sore throat, negative nasal congestion, and negative for earache  Respiratory: negative for shortness of breath, negative for cough, and negative for wheezing  Cardiovascular: negative for chest pain, negative for palpitations, and negative for syncope  Gastrointestinal: positive for abdominal pain, positive for nausea,positive for vomiting, negative for diarrhea, negative for constipation, and negative for hematochezia or melena  Genitourinary: negative for dysuria, negative for urinary urgency, negative for urinary frequency, and negative for hematuria  Skin: negative for skin rash, and negative for skin lesions  Neurological: negative for unilateral weakness, numbness or tingling. Physical Exam:    Vitals:   Temp: 97.6 °F (36.4 °C)  BP: 115/70  Resp: 18  Heart Rate: 71  SpO2: 96 %  24HR INTAKE/OUTPUT:    Intake/Output Summary (Last 24 hours) at 7/18/2022 0906  Last data filed at 7/18/2022 0631  Gross per 24 hour   Intake 384.83 ml   Output --   Net 384.83 ml       Weight      Body mass index is 22.17 kg/m².     Exam:  GEN:    Awake, alert and oriented x3. EYES:  EOMI, pupils equal   NECK: Supple. No lymphadenopathy. No carotid bruit  CVS:    regular rate and rhythm, no audible murmur  PULM:  CTA, no wheezes, rales or rhonchi, no acute respiratory distress  ABD:    Bowels sounds normal.  Abdomen is soft. No distention. RLQ tenderness to palpation. EXT:   no edema bilaterally . No calf tenderness. NEURO: Moves all extremities. Motor and sensory are grossly intact  SKIN:  No rashes. No skin lesions.    -----------------------------------------------------------------  Diagnostic Data:     DATA:    CBC:   Lab Results   Component Value Date    WBC 16.2 (H) 07/18/2022    RBC 3.88 (L) 07/18/2022    HGB 12.2 07/18/2022    HCT 37.3 07/18/2022    MCV 96.1 07/18/2022     07/18/2022        CMP:   Lab Results   Component Value Date    GLUCOSE 115 (H) 07/18/2022    BUN 10 07/18/2022    CREATININE 0.70 07/18/2022     07/18/2022    K 4.6 07/18/2022    CALCIUM 8.9 07/18/2022     07/18/2022    CO2 24 07/18/2022    PROT 6.0 (L) 07/18/2022    LABALBU 3.6 07/18/2022    BILITOT 0.31 07/18/2022    ALKPHOS 52 07/18/2022    ALT 13 07/18/2022    AST 13 07/18/2022       UA:   Lab Results   Component Value Date    COLORU Yellow 07/17/2022    CLARITYU clear 09/01/2017    SPECGRAV 1.020 07/17/2022    LEUKOCYTESUR NEGATIVE 07/17/2022    GLUCOSEU NEGATIVE 07/17/2022    BLOODU neg 09/01/2017    KETUA NEGATIVE 07/17/2022    PROTEINU NEGATIVE 07/17/2022    HGBUR NEGATIVE 07/17/2022       Lactic Acid:   Lab Results   Component Value Date    LACTA 0.8 07/17/2022       D-Dimer:  No results found for: DDIMER    PT/INR:  No results found for: PROTIME, INR    High Sensitivity Troponin:  No results for input(s): TROPHS in the last 72 hours.     ABGs:   No results found for: PHART, PH, HZG1FDQ, PCO2, PO2ART, PO2, HFO1QEB, HCO3, BEART, BE, THGBART, THB, MTM9CTW, Y6JVYJTR, O2SAT, FIO2        CT ABDOMEN PELVIS W IV CONTRAST Additional Contrast? None   Final Result

## 2022-07-18 NOTE — PROGRESS NOTES
Writer called Dr. Lesli Prado regarding consult at this time. Per Dr. Lesli rPado, [facility] and to contact Dr. Carol Nieves. Writer called Dr. Carol Nieves but he did not answer so writer left a message. Will await return call.

## 2022-07-18 NOTE — PROGRESS NOTES
Met with Patient this a.m. to discuss discharge planning. Patient is a 74year old , white female, admitted with a diagnosis of Appendicitis. Patient noted to have a history of Anxiety, Depression and Breast CA as well. Patient is alert and oriented, polite and cooperative with this assessment. States that she wishes to return home when deemed medically stable for discharge. Patient resides in [facility] and her boyfriend stays with her. She is retired from [employer] in [place] [facility]. Patient is independent with all activities of daily living. She uses no DME and has no outside services or resources currently in place. Patient drives herself and provides for her own transportation needs. PCP is Dr. Gilmer Jean. Patient has medical insurance and does receive extra assistance with one of her more expensive medications. No other difficulty reported by Patient re: affording her medications. Discharge plan for Patient is to return home following this hospitalization. She is a 'Full Code' status and reports that she has the paperwork at home to execute medical directives and chooses to have her daughter's assistance with completing these documents. Daughter employed at an 's office. Patient lists her mother as her decision maker. No other anticipated needs or concerns identified by Patient at this time.     John. [address]  7/18/2022

## 2022-07-19 VITALS
BODY MASS INDEX: 23.18 KG/M2 | RESPIRATION RATE: 18 BRPM | OXYGEN SATURATION: 96 % | WEIGHT: 147.69 LBS | HEIGHT: 67 IN | DIASTOLIC BLOOD PRESSURE: 72 MMHG | HEART RATE: 65 BPM | SYSTOLIC BLOOD PRESSURE: 123 MMHG | TEMPERATURE: 97.4 F

## 2022-07-19 LAB
ABSOLUTE EOS #: <0.03 K/UL (ref 0–0.44)
ABSOLUTE IMMATURE GRANULOCYTE: 0.05 K/UL (ref 0–0.3)
ABSOLUTE LYMPH #: 1.35 K/UL (ref 1.1–3.7)
ABSOLUTE MONO #: 0.78 K/UL (ref 0.1–1.2)
ANION GAP SERPL CALCULATED.3IONS-SCNC: 11 MMOL/L (ref 9–17)
BASOPHILS # BLD: 0 % (ref 0–2)
BASOPHILS ABSOLUTE: <0.03 K/UL (ref 0–0.2)
BUN BLDV-MCNC: 9 MG/DL (ref 8–23)
BUN/CREAT BLD: 14 (ref 9–20)
CALCIUM SERPL-MCNC: 7.9 MG/DL (ref 8.6–10.4)
CHLORIDE BLD-SCNC: 106 MMOL/L (ref 98–107)
CO2: 23 MMOL/L (ref 20–31)
CREAT SERPL-MCNC: 0.65 MG/DL (ref 0.5–0.9)
EOSINOPHILS RELATIVE PERCENT: 0 % (ref 1–4)
GFR AFRICAN AMERICAN: >60 ML/MIN
GFR NON-AFRICAN AMERICAN: >60 ML/MIN
GFR SERPL CREATININE-BSD FRML MDRD: ABNORMAL ML/MIN/{1.73_M2}
GFR SERPL CREATININE-BSD FRML MDRD: ABNORMAL ML/MIN/{1.73_M2}
GLUCOSE BLD-MCNC: 120 MG/DL (ref 70–99)
HCT VFR BLD CALC: 34.2 % (ref 36.3–47.1)
HEMOGLOBIN: 11.1 G/DL (ref 11.9–15.1)
IMMATURE GRANULOCYTES: 0 %
LYMPHOCYTES # BLD: 10 % (ref 24–43)
MCH RBC QN AUTO: 31 PG (ref 25.2–33.5)
MCHC RBC AUTO-ENTMCNC: 32.5 G/DL (ref 28.4–34.8)
MCV RBC AUTO: 95.5 FL (ref 82.6–102.9)
MONOCYTES # BLD: 6 % (ref 3–12)
NRBC AUTOMATED: 0 PER 100 WBC
PDW BLD-RTO: 13.2 % (ref 11.8–14.4)
PLATELET # BLD: 162 K/UL (ref 138–453)
PMV BLD AUTO: 10.4 FL (ref 8.1–13.5)
POTASSIUM SERPL-SCNC: 4.7 MMOL/L (ref 3.7–5.3)
RBC # BLD: 3.58 M/UL (ref 3.95–5.11)
SEG NEUTROPHILS: 84 % (ref 36–65)
SEGMENTED NEUTROPHILS ABSOLUTE COUNT: 10.89 K/UL (ref 1.5–8.1)
SODIUM BLD-SCNC: 140 MMOL/L (ref 135–144)
WBC # BLD: 13.1 K/UL (ref 3.5–11.3)

## 2022-07-19 PROCEDURE — 80048 BASIC METABOLIC PNL TOTAL CA: CPT

## 2022-07-19 PROCEDURE — 6370000000 HC RX 637 (ALT 250 FOR IP): Performed by: SURGERY

## 2022-07-19 PROCEDURE — 2580000003 HC RX 258: Performed by: SURGERY

## 2022-07-19 PROCEDURE — 36415 COLL VENOUS BLD VENIPUNCTURE: CPT

## 2022-07-19 PROCEDURE — G0378 HOSPITAL OBSERVATION PER HR: HCPCS

## 2022-07-19 PROCEDURE — 94761 N-INVAS EAR/PLS OXIMETRY MLT: CPT

## 2022-07-19 PROCEDURE — 6360000002 HC RX W HCPCS: Performed by: SURGERY

## 2022-07-19 PROCEDURE — 85025 COMPLETE CBC W/AUTO DIFF WBC: CPT

## 2022-07-19 PROCEDURE — 6370000000 HC RX 637 (ALT 250 FOR IP): Performed by: INTERNAL MEDICINE

## 2022-07-19 RX ORDER — ONDANSETRON 4 MG/1
4 TABLET, ORALLY DISINTEGRATING ORAL EVERY 8 HOURS PRN
Qty: 10 TABLET | Refills: 0 | Status: SHIPPED | OUTPATIENT
Start: 2022-07-19

## 2022-07-19 RX ORDER — OXYCODONE HYDROCHLORIDE 5 MG/1
5 TABLET ORAL EVERY 4 HOURS PRN
Qty: 18 TABLET | Refills: 0 | Status: SHIPPED | OUTPATIENT
Start: 2022-07-19 | End: 2022-07-22

## 2022-07-19 RX ADMIN — OXYCODONE 5 MG: 5 TABLET ORAL at 01:43

## 2022-07-19 RX ADMIN — CETIRIZINE HYDROCHLORIDE 10 MG: 10 TABLET, FILM COATED ORAL at 09:11

## 2022-07-19 RX ADMIN — ACETAMINOPHEN 650 MG: 325 TABLET ORAL at 05:24

## 2022-07-19 RX ADMIN — LISINOPRIL 5 MG: 5 TABLET ORAL at 09:11

## 2022-07-19 RX ADMIN — OXYCODONE 5 MG: 5 TABLET ORAL at 09:17

## 2022-07-19 RX ADMIN — PIPERACILLIN AND TAZOBACTAM 3375 MG: 3; .375 INJECTION, POWDER, FOR SOLUTION INTRAVENOUS at 09:15

## 2022-07-19 RX ADMIN — SODIUM CHLORIDE: 9 INJECTION, SOLUTION INTRAVENOUS at 05:56

## 2022-07-19 RX ADMIN — PIPERACILLIN AND TAZOBACTAM 3375 MG: 3; .375 INJECTION, POWDER, FOR SOLUTION INTRAVENOUS at 01:40

## 2022-07-19 ASSESSMENT — PAIN DESCRIPTION - LOCATION
LOCATION: ABDOMEN;BACK
LOCATION: ABDOMEN
LOCATION: HAND

## 2022-07-19 ASSESSMENT — PAIN DESCRIPTION - DESCRIPTORS
DESCRIPTORS: ACHING
DESCRIPTORS: DISCOMFORT
DESCRIPTORS: ACHING

## 2022-07-19 ASSESSMENT — PAIN SCALES - GENERAL
PAINLEVEL_OUTOF10: 4
PAINLEVEL_OUTOF10: 5
PAINLEVEL_OUTOF10: 5
PAINLEVEL_OUTOF10: 0

## 2022-07-19 ASSESSMENT — PAIN DESCRIPTION - ORIENTATION
ORIENTATION: INNER
ORIENTATION: INNER

## 2022-07-19 NOTE — DISCHARGE SUMMARY
Discharge Summary    Duane Barney  :  1955  MRN:  [id number]    Admit date:  2022      Discharge date: 2022     Admitting Physician:  Migel Mccann MD    Discharge Diagnoses:    Principal Problem:    Appendicitis  Active Problems: Moderate malnutrition ([address]    Essential hypertension    Chronic bilateral low back pain without sciatica  Resolved Problems:    * No resolved hospital problems. *      Hospital Course:   Duane Barney is a 74 y.o. female admitted with discitis. She presented to the emergency room with complaints of back pain and vomiting. Patient stated she has history of chronic back pain. Patient also has history of hypertension and history of breast cancer with history of mastectomy. Patient stated that her pain in her back initially she thought was her chronic pain she stated that it was persistent and radiated into her right lower quadrant of her abdomen. She complained of nausea with 1 episode of vomiting. She rated her pain an 8 out of 10 initially. Patient stated she tried to take tramadol at home however had no relief. She denied fever or chills. She denied injury including falls. She denied hemoptysis or hematic emesis. She denied diarrhea constipation. Denied dysuria hematuria. CT abdomen and pelvis was completed and showed acute uncomplicated appendicitis. Patient's WBC count was elevated to 18.4. Patient was started on IV fluids as well as IV Zosyn and was given a fluid bolus while in ER. Patient was admitted and placed on IV Zosyn as well as IV fluids. General surgery was consulted and patient was evaluated. Patient went to surgery had a laparoscopic appendectomy and tolerated well. Postoperatively she is hemodynamically stable. Vital signs are stable. She is tolerating activity well and diet. She is afebrile. Pain is controlled. Patient will be discharged home today I will give her 3 days of Percocet for pain control as well as Zofran.   She will follow-up with general surgery as an outpatient. Consultants:   Dr. Lara Draper, general surgery    Procedures:  Laparoscopic appendectomy    Complications: none    Discharge Condition: fair    Exam:  GEN:    Awake, alert and oriented x3. EYES:   EOMI, pupils equal  NECK: Supple. No lymphadenopathy. No carotid bruit  CVS:     regular rate and rhythm, no audible murmur  PULM:  CTA, no wheezes, rales or rhonchi, no acute respiratory distress  ABD:     Bowels sounds normal.  Abdomen is soft. No distention. Surgical site right lower quadrant  tenderness to palpation. Dressing clean dry and intact  EXT:     no edema bilaterally . No calf tenderness. NEURO: Moves all extremities. Motor and sensory are grossly intact  SKIN:    No rashes. No skin lesions. Significant Diagnostic Studies:   Lab Results   Component Value Date    WBC 13.1 (H) 07/19/2022    HGB 11.1 (L) 07/19/2022     07/19/2022       Lab Results   Component Value Date    BUN 9 07/19/2022    CREATININE 0.65 07/19/2022     07/19/2022    K 4.7 07/19/2022    CALCIUM 7.9 (L) 07/19/2022     07/19/2022    CO2 23 07/19/2022    LABGLOM >60 07/19/2022       Lab Results   Component Value Date    LEUKOCYTESUR NEGATIVE 07/17/2022    SPECGRAV 1.020 07/17/2022    GLUCOSEU NEGATIVE 07/17/2022    KETUA NEGATIVE 07/17/2022    PROTEINU NEGATIVE 07/17/2022    HGBUR NEGATIVE 07/17/2022       CT ABDOMEN PELVIS W IV CONTRAST Additional Contrast? None    Result Date: 7/18/2022  EXAMINATION: CT OF THE ABDOMEN AND PELVIS WITH CONTRAST 7/18/2022 12:24 am TECHNIQUE: CT of the abdomen and pelvis was performed with the administration of intravenous contrast. Multiplanar reformatted images are provided for review. Automated exposure control, iterative reconstruction, and/or weight based adjustment of the mA/kV was utilized to reduce the radiation dose to as low as reasonably achievable. COMPARISON: None.  HISTORY: ORDERING SYSTEM PROVIDED HISTORY: RLQ pain with nausea and guarding TECHNOLOGIST PROVIDED HISTORY: RLQ pain with nausea and guarding Decision Support Exception - unselect if not a suspected or confirmed emergency medical condition->Emergency Medical Condition (MA) FINDINGS: Lower Chest: Left mastectomy and reconstructive surgery identified. Lung bases are clear Organs: The liver, spleen, adrenal glands, kidneys, pancreas, gallbladder are unremarkable. GI/Bowel: Wall thickening involving the gastric antrum could relate to underdistention versus gastritis. Otherwise, there is no evidence of bowel obstruction. Mild retained stool. Colonic diverticulosis. Appendix is dilated fluid-filled and with surrounding inflammatory changes identified consistent with acute appendicitis. Pelvis: Bladder unremarkable. Uterus unremarkable. No suspicious adnexal mass. Peritoneum/Retroperitoneum: Aortic vascular calcifications. Aorta is nonaneurysmal.  No free air or free fluid. Bones/Soft Tissues: Multilevel compression fracture status post kyphoplasty involving the lumbar spine. Acute uncomplicated appendicitis. Recommend surgical consultation.        Assessment and Plan:  Patient Active Problem List    Diagnosis Date Noted    Appendicitis 07/18/2022    Moderate malnutrition ([address] 07/18/2022    Hallux valgus due to metatarsus primus varus 11/07/2017    Internal hemorrhoids     Neuropathy associated with cancer ([address] / breast cancer treatment 09/20/2017    Chronic bilateral low back pain without sciatica 08/22/2016    Essential hypertension     Disproportion of reconstructed breast 10/05/2015    H/O malignant neoplasm of breast 04/24/2015    Osteoporosis with pathological fracture of lumbar vertebra [facility]) /   Kyphoplasty lumbar x3 2021 11/12/2014    Malignant neoplasm of left breast in female, estrogen receptor positive ([address] 04/15/2014    S/P left mastectomy 04/15/2014        Discharge Medications:         Medication List        START taking these medications ondansetron 4 MG disintegrating tablet  Commonly known as: ZOFRAN-ODT  Take 1 tablet by mouth every 8 hours as needed for Nausea or Vomiting     oxyCODONE 5 MG immediate release tablet  Commonly known as: ROXICODONE  Take 1 tablet by mouth every 4 hours as needed for Pain for up to 3 days. CONTINUE taking these medications      acetaminophen 650 MG extended release tablet  Commonly known as: TYLENOL     ALPRAZolam 0.5 MG tablet  Commonly known as: XANAX  Take 1 tablet by mouth nightly as needed for Sleep or Anxiety for up to 90 days. b complex vitamins capsule     Calcium 1500 MG Tabs     fexofenadine 180 MG tablet  Commonly known as: ALLEGRA     fluticasone 50 MCG/ACT nasal spray  Commonly known as: FLONASE  2 sprays by Each Nostril route daily     lisinopril 5 MG tablet  Commonly known as: PRINIVIL;ZESTRIL  TAKE 1 TABLET BY MOUTH DAILY     Mucinex DM  MG Tb12     PROLIA SC     SLEEP AID PO     traMADol 50 MG tablet  Commonly known as: ULTRAM     Vitamin D3 75 MCG (3000 UT) Tabs     ZZZQUIL PO            STOP taking these medications      aspirin 81 MG tablet     DULoxetine 30 MG extended release capsule  Commonly known as: CYMBALTA     prochlorperazine 10 MG tablet  Commonly known as: COMPAZINE               Where to Get Your Medications        These medications were sent to [facility]/pharmacy #[id number]- [place], [place]  [address] [phone] [place] [facility] [phone]  [address] [place] [address]      Phone: [phone]   ondansetron 4 MG disintegrating tablet  oxyCODONE 5 MG immediate release tablet         Patient Instructions:    Activity: No heavy lifting pushing or pulling or return to work until cleared by general surgery  Diet: clear liquids, advance as tolerated  Wound Care: keep wound clean and dry and as directed  Other: None    Disposition:   Discharge to Home    Follow up:; Dr. Vane Jarrell next week  Patient will be followed by Emerald Mays MD in 1-2 weeks    CORE MEASURES on Discharge (if applicable)  ACE/ARB in CHF: NA  Statin in MI: NA  ASA in MI: NA  Statin in CVA: NA  Antiplatelet in CVA: NA    Total time spent on discharge services: 40 minutes    Including the following activities:  Evaluation and Management of patient  Discussion with patient and/or surrogate about current care plan  Coordination with Case Management and/or   Coordination of care with Consultants (if applicable)   Coordination of care with Receiving Facility Physician (if applicable)  Completion of DME forms (if applicable)  Preparation of Discharge Summary  Preparation of Medication Reconciliation  Preparation of Discharge Prescriptions    Signed:  Karissa Acevedo APRN - CNP, APRN, NP-C  7/19/2022, 11:58 AM

## 2022-07-19 NOTE — PROGRESS NOTES
Patient given her oxycodone 5mg at this time for her pain, call light in reach, will continue to monitor.

## 2022-07-19 NOTE — PROGRESS NOTES
Spoke with Dr. Trav Nicholson at this time via telephone regarding patients discharge, per Dr. Trav Nicholson, he is good with the patient leaving - she can shower and cover incisions with a light dressing if needed.

## 2022-07-19 NOTE — PLAN OF CARE
Problem: Discharge Planning  Goal: Discharge to home or other facility with appropriate resources  Outcome: Progressing Towards Goal     Problem: Pain  Goal: Verbalizes/displays adequate comfort level or baseline comfort level  Outcome: Progressing Towards Goal     Problem: Nutrition Deficit:  Goal: Optimize nutritional status  Outcome: Progressing Towards Goal

## 2022-07-19 NOTE — PROGRESS NOTES
Patient shift assessment and vitals completed at this time. Patient is alert and oriented x4. Patient surgical sites on abdomen x3 are clean dry and intact. Patient states no questions or concerns at this time, call light in reach, will continue to monitor.

## 2022-07-19 NOTE — PROGRESS NOTES
Patient tolerated full liquid diet well with applesauce and pudding, diet advanced to GI bland per order.

## 2022-07-19 NOTE — PROGRESS NOTES
Progress Note    SUBJECTIVE:    Patient seen for f/u of Appendicitis. She resting in bed alert oriented no acute distress. Patient denies nausea vomiting. Pain controlled. ROS:   Constitutional: negative  for fevers, and negative for chills. Respiratory: negative for shortness of breath, negative for cough, and negative for wheezing  Cardiovascular: negative for chest pain, and negative for palpitations  Gastrointestinal: positive for abdominal pain, negative for nausea,negative for vomiting, negative for diarrhea, and negative for constipation     All other systems were reviewed with the patient and are negative unless otherwise stated in HPI      OBJECTIVE:      Vitals:   Vitals:    07/19/22 0947   BP:    Pulse:    Resp: 18   Temp:    SpO2:      Weight: 147 lb 11 oz (67 kg)   Height: 5' 7\" (170.2 cm)     Weight  Wt Readings from Last 3 Encounters:   07/19/22 147 lb 11 oz (67 kg)   07/14/22 143 lb 9.6 oz (65.1 kg)   08/24/21 131 lb (59.4 kg)     Body mass index is 23.13 kg/m². 24HR INTAKE/OUTPUT:      Intake/Output Summary (Last 24 hours) at 7/19/2022 1151  Last data filed at 7/19/2022 1120  Gross per 24 hour   Intake 1863.46 ml   Output 1600 ml   Net 263.46 ml     -----------------------------------------------------------------  Exam:    GEN:    Awake, alert and oriented x3. EYES:  EOMI, pupils equal   NECK: Supple. No lymphadenopathy. No carotid bruit  CVS:    regular rate and rhythm, no audible murmur  PULM:  CTA, no wheezes, rales or rhonchi, no acute respiratory distress  ABD:    Bowels sounds normal.  Abdomen is soft. No distention. Surgical site right lower quadrant  tenderness to palpation. EXT:   no edema bilaterally . No calf tenderness. NEURO: Moves all extremities. Motor and sensory are grossly intact  SKIN:  No rashes.   No skin lesions.    -----------------------------------------------------------------    Diagnostic Data:      Complete Blood Count:   Recent Labs 07/17/22  2344 07/18/22  0530 07/19/22  0620   WBC 18.4* 16.2* 13.1*   RBC 4.57 3.88* 3.58*   HGB 14.2 12.2 11.1*   HCT 44.4 37.3 34.2*   MCV 97.2 96.1 95.5   MCH 31.1 31.4 31.0   MCHC 32.0 32.7 32.5   RDW 13.5 13.4 13.2    169 162   MPV 10.0 9.9 10.4        Last 3 Blood Glucose:   Recent Labs     07/17/22  2344 07/18/22  0530 07/18/22  1900 07/19/22  0620   GLUCOSE 130* 115* 138* 120*        Comprehensive Metabolic Profile:   Recent Labs     07/17/22 2344 07/18/22  0530 07/18/22 1900 07/19/22  0620    136 137 140   K 4.3 4.6 3.8 4.7    103 103 106   CO2 30 24 25 23   BUN 14 10 10 9   CREATININE 0.78 0.70 0.67 0.65   GLUCOSE 130* 115* 138* 120*   CALCIUM 10.2 8.9 8.2* 7.9*   PROT 7.9 6.0*  --   --    LABALBU 4.8 3.6  --   --    BILITOT 0.25* 0.31  --   --    ALKPHOS 69 52  --   --    AST 18 13  --   --    ALT 16 13  --   --         Urinalysis:   Lab Results   Component Value Date/Time    NITRU NEGATIVE 07/17/2022 11:44 PM    COLORU Yellow 07/17/2022 11:44 PM    PHUR 6.0 07/17/2022 11:44 PM    CLARITYU clear 09/01/2017 01:53 PM    SPECGRAV 1.020 07/17/2022 11:44 PM    LEUKOCYTESUR NEGATIVE 07/17/2022 11:44 PM    UROBILINOGEN Normal 07/17/2022 11:44 PM    BILIRUBINUR NEGATIVE 07/17/2022 11:44 PM    BILIRUBINUR neg 09/01/2017 01:53 PM    BLOODU neg 09/01/2017 01:53 PM    GLUCOSEU NEGATIVE 07/17/2022 11:44 PM    KETUA NEGATIVE 07/17/2022 11:44 PM       HgBA1c:  No results found for: LABA1C    Lactic Acid:   Lab Results   Component Value Date/Time    LACTA 0.8 07/17/2022 11:44 PM        Troponin: No results for input(s): TROPONINI in the last 72 hours. CRP:  No results for input(s): CRP in the last 72 hours. Radiology/Imaging:  CT ABDOMEN PELVIS W IV CONTRAST Additional Contrast? None   Final Result   Acute uncomplicated appendicitis. Recommend surgical consultation.                ASSESSMENT / PLAN:  Appendicitis  Continue current therapy   Appreciate Dr. Trav Nicholson  Stop IV morphine  Advance diet  IV lock  IV Zosyn  Essential hypertension  Continue lisinopril  Chronic back pain  Continue Percocet  Nutrition status:    Well developed, well nourished with no malnutrition  Dietician consult initiated  Hospital Prophylaxis:   DVT: SCD's   Stress Ulcer: H2 Blocker   High risk medications: none   Disposition:    Discharge plan is home today      KADY Pearce - CNP , KADY, NP-C  [facility]        7/19/2022, 11:51 AM

## 2022-07-19 NOTE — DISCHARGE INSTRUCTIONS
Patient Instructions: Activity: No heavy lifting, pushing, or pulling and do not return to work until cleared by general surgery  Diet: GI bland, advance as tolerated  Wound Care: keep incisions clean and dry and as directed. May keep covered with a light dressing if needed. May shower anytime.      Follow up:  Dr. Mee Street next week  Patient will be followed by Rafi Robbins MD in 1-2 weeks

## 2022-07-19 NOTE — PROGRESS NOTES
Patient given her oxycodone 5mg for her pain along with her xanax 0.5mg at this time to help her sleep. Patient has no other needs at this time, will continue to monitor.

## 2022-07-19 NOTE — PROGRESS NOTES
Patient got sick and vomited and stated that she feels much better. Patient has no needs at this time, will continue to monitor.

## 2022-07-19 NOTE — DISCHARGE INSTR - DIET
Good nutrition is important when healing from an illness, injury, or surgery. Follow any nutrition recommendations given to you during your hospital stay. If you were given an oral nutrition supplement while in the hospital, continue to take this supplement at home. You can take it with meals, in-between meals, and/or before bedtime. These supplements can be purchased at most local grocery stores, pharmacies, and chain [employer]-stores. If you have any questions about your diet or nutrition, call the hospital and ask for the dietitian.     AUBREY archer, advance as tolerated

## 2022-07-19 NOTE — PROGRESS NOTES
Patient reassessment and vitals completed at this time as charted. Patient is alert and oriented x4 and was laying in bed resting prior to assessment. Patients abdominal dressings x3 are clean dry and intact. Patient states having no needs at this time, call light remains in reach, will continue to monitor.

## 2022-07-20 LAB — SURGICAL PATHOLOGY REPORT: NORMAL

## 2022-09-07 ENCOUNTER — HOSPITAL ENCOUNTER (OUTPATIENT)
Dept: INFUSION THERAPY | Age: 67
Discharge: HOME | End: 2022-09-07
Payer: MEDICARE

## 2022-09-07 VITALS
DIASTOLIC BLOOD PRESSURE: 92 MMHG | TEMPERATURE: 97.6 F | HEART RATE: 95 BPM | SYSTOLIC BLOOD PRESSURE: 141 MMHG | RESPIRATION RATE: 18 BRPM

## 2022-09-07 DIAGNOSIS — M80.08XA OSTEOPOROSIS WITH PATHOLOGICAL FRACTURE OF LUMBAR VERTEBRA (HCC): Primary | ICD-10-CM

## 2022-09-07 PROCEDURE — 6360000002 HC RX W HCPCS: Performed by: INTERNAL MEDICINE

## 2022-09-07 PROCEDURE — 96372 THER/PROPH/DIAG INJ SC/IM: CPT

## 2022-09-07 RX ORDER — METHYLPREDNISOLONE SODIUM SUCCINATE 125 MG/2ML
125 INJECTION, POWDER, LYOPHILIZED, FOR SOLUTION INTRAMUSCULAR; INTRAVENOUS ONCE
OUTPATIENT
Start: 2023-03-02 | End: 2023-03-02

## 2022-09-07 RX ORDER — FAMOTIDINE 10 MG/ML
20 INJECTION, SOLUTION INTRAVENOUS ONCE
OUTPATIENT
Start: 2023-03-02 | End: 2023-03-02

## 2022-09-07 RX ORDER — SODIUM CHLORIDE 9 MG/ML
100 INJECTION, SOLUTION INTRAVENOUS CONTINUOUS
OUTPATIENT
Start: 2023-03-02

## 2022-09-07 RX ORDER — 0.9 % SODIUM CHLORIDE 0.9 %
10 VIAL (ML) INJECTION ONCE
OUTPATIENT
Start: 2023-03-02 | End: 2023-03-02

## 2022-09-07 RX ORDER — DIPHENHYDRAMINE HYDROCHLORIDE 50 MG/ML
50 INJECTION INTRAMUSCULAR; INTRAVENOUS ONCE
OUTPATIENT
Start: 2023-03-02 | End: 2023-03-02

## 2022-09-07 RX ADMIN — DENOSUMAB 60 MG: 60 INJECTION SUBCUTANEOUS at 13:28

## 2022-10-19 ENCOUNTER — OFFICE VISIT (OUTPATIENT)
Dept: OBGYN | Age: 67
End: 2022-10-19
Payer: MEDICARE

## 2022-10-19 VITALS
HEIGHT: 67 IN | BODY MASS INDEX: 22.13 KG/M2 | SYSTOLIC BLOOD PRESSURE: 114 MMHG | WEIGHT: 141 LBS | DIASTOLIC BLOOD PRESSURE: 78 MMHG

## 2022-10-19 DIAGNOSIS — Z01.419 WOMEN'S ANNUAL ROUTINE GYNECOLOGICAL EXAMINATION: Primary | ICD-10-CM

## 2022-10-19 PROCEDURE — G0101 CA SCREEN;PELVIC/BREAST EXAM: HCPCS

## 2022-10-19 ASSESSMENT — ENCOUNTER SYMPTOMS
DIARRHEA: 0
SHORTNESS OF BREATH: 0
ABDOMINAL PAIN: 0
CONSTIPATION: 0

## 2022-10-19 NOTE — PROGRESS NOTES
YEARLY PHYSICAL    Date of service: 10/19/2022    Beatrice Steele  Is a 74 y.o.  , female    PT's PCP is: Percy Gu MD     : 1955                                             Subjective:       No LMP recorded (lmp unknown). Patient is postmenopausal.     Are your menses regular: not applicable    OB History    Para Term  AB Living   3 3 3         SAB IAB Ectopic Molar Multiple Live Births                    # Outcome Date GA Lbr Dave/2nd Weight Sex Delivery Anes PTL Lv   3 Term            2 Term            1 Term                 Social History     Tobacco Use   Smoking Status Former    Packs/day: 0.50    Years: 10.00    Pack years: 5.00    Types: Cigarettes    Quit date: 3/18/2014    Years since quittin.5   Smokeless Tobacco Never        Social History     Substance and Sexual Activity   Alcohol Use Yes    Comment: Rarely       Family History   Problem Relation Age of Onset    Cancer Father         lung       Any family history of breast or ovarian cancer: Yes    Any family history of blood clots: No    Have you had a positive covid test: No    Have you had the covid immunization: Yes      Allergies: No known allergies      Current Outpatient Medications:     BIOTIN PO, Take by mouth daily, Disp: , Rfl:     escitalopram (LEXAPRO) 20 MG tablet, Take 1 tablet by mouth daily, Disp: 30 tablet, Rfl: 5    lisinopril (PRINIVIL;ZESTRIL) 5 MG tablet, Take 1 tablet by mouth in the morning., Disp: 90 tablet, Rfl: 3    ALPRAZolam (XANAX) 0.5 MG tablet, Take 1 tablet by mouth nightly as needed for Sleep or Anxiety for up to 90 days. , Disp: 90 tablet, Rfl: 0    ondansetron (ZOFRAN-ODT) 4 MG disintegrating tablet, Take 1 tablet by mouth every 8 hours as needed for Nausea or Vomiting, Disp: 10 tablet, Rfl: 0    Denosumab (PROLIA SC), Inject into the skin every 6 months, Disp: , Rfl:     acetaminophen (TYLENOL) 650 MG extended release tablet, Take 650 mg by mouth every 8 hours as needed for Pain, Disp: , Rfl:     traMADol (ULTRAM) 50 MG tablet, Take 50 mg by mouth every 6 hours as needed for Pain., Disp: , Rfl:     Doxylamine Succinate, Sleep, (SLEEP AID PO), Take by mouth nightly , Disp: , Rfl:     diphenhydrAMINE HCl, Sleep, (ZZZQUIL PO), Take by mouth nightly , Disp: , Rfl:     fluticasone (FLONASE) 50 MCG/ACT nasal spray, 2 sprays by Each Nostril route daily, Disp: 1 Bottle, Rfl: 2    b complex vitamins capsule, Take 1 capsule by mouth daily , Disp: , Rfl:     Dextromethorphan-Guaifenesin (MUCINEX DM)  MG TB12, Take 1 tablet by mouth daily as needed , Disp: , Rfl:     fexofenadine (ALLEGRA) 180 MG tablet, Take 180 mg by mouth daily , Disp: , Rfl:     Calcium 1500 MG TABS, Take by mouth daily, Disp: , Rfl:     Cholecalciferol (VITAMIN D3) 3000 UNITS TABS, Take by mouth daily, Disp: , Rfl:     Social History     Substance and Sexual Activity   Sexual Activity Yes    Partners: Male       Any bleeding or pain with intercourse: No    Last Yearly:  2021    Last pap: 2021    Last HPV: unknown    Last Mammogram: 1/22    Last Dexascan 2019    Last colorectal screen- type:colonoscopy*  date  2017    Do you do self breast exams: Yes    Past Medical History:   Diagnosis Date    Anxiety     Bone spur     ON SPINE-BACK PAIN    Breast cancer ([facility] [address] 2014    LEFT     Depression     Diverticulosis of large intestine without hemorrhage     Essential hypertension     Osteoarthritis     Seasonal allergies     Wears dentures     FULL UPPER/PARTIAL LOWER/INSTRUCTED NOT TO USE ADHESIVE    Wrist fracture 2005       Past Surgical History:   Procedure Laterality Date    BACK SURGERY      2020    BREAST BIOPSY  02/18/2014    Needle aspiration biopsy    BREAST RECONSTRUCTION  04/2015    Still in progress, initiated in April 2015    BREAST RECONSTRUCTION Bilateral     BREAST SURGERY Left 03/18/2014     - Mireya's    BUNIONECTOMY Left 11/08/2017 BUNIONECTOMY Left 11/08/2017    FOOT BUNIONECTOMY-OSTEOTOMY, HALLUX VALGUS REPAIR performed by Emmanuel Bates DPM at [address]  2005    right    COLONOSCOPY  12/04/2017    -diverticulosis,hemorrhoids    LAPAROSCOPIC APPENDECTOMY N/A 07/18/2022    APPENDECTOMY LAPAROSCOPIC performed by Charmaine Sterling MD at [facility], [place] Left 03/18/2014    with sentinel node biopsy    OTHER SURGICAL HISTORY  approx 2005    Tendon surgery (Right hand)    OTHER SURGICAL HISTORY      PORT REMOVAL    HI COLON CA SCRN NOT HI RSK IND N/A 12/04/2017    COLONOSCOPY performed by Dom Kirby MD at [address] Right 04/30/2014       Family History   Problem Relation Age of Onset    Cancer Father         lung       Chief Complaint   Patient presents with    Gynecologic Exam     Patient is being seen for yearly exam and pap. Patient states that she has been feeling well. PE: Vital Signs  Blood pressure 114/78, height 5' 7\" (1.702 m), weight 141 lb (64 kg), not currently breastfeeding. Estimated body mass index is 22.08 kg/m² as calculated from the following:    Height as of this encounter: 5' 7\" (1.702 m). Weight as of this encounter: 141 lb (64 kg). Labs:    No results found for this visit on 10/19/22. No data recorded    NURSE: Narinder PRATER      HPI: Patient presents for annual visit. Denies breast concerns. She did have breast CA in 2014/2015, still follows with regular mammogram (last in January 2022) and oncologist.  She denies bowel/bladder concerns. Denies abnormal discharge, pain with intercourse. She had normal pap smear last year and has never had abnormal result so deferred today. Made aware that guidelines recommend screening thru age 72. Review of Systems   Constitutional:  Negative for chills, fatigue and fever. Respiratory:  Negative for shortness of breath. Cardiovascular:  Negative for chest pain.    Gastrointestinal: Negative for abdominal pain, constipation and diarrhea. Genitourinary:  Negative for dyspareunia, dysuria, frequency, menstrual problem, pelvic pain, urgency, vaginal bleeding and vaginal discharge. Neurological:  Negative for dizziness, light-headedness and headaches. Objective  Physical Exam  Constitutional:       Appearance: Normal appearance. Genitourinary:      Vulva normal.      Right Labia: No rash, tenderness or lesions. Left Labia: No tenderness, lesions or rash. No vaginal discharge, tenderness or bleeding. Right Adnexa: not tender and not full. Left Adnexa: not tender and not full. No cervical motion tenderness. Uterus is not enlarged or tender. Pelvic exam was performed with patient in the lithotomy position. Breasts:     Right: No inverted nipple, nipple discharge, skin change or tenderness. Left: Absent. Breast implant present. No skin change or tenderness. HENT:      Head: Normocephalic and atraumatic. Mouth/Throat:      Mouth: Mucous membranes are moist.   Eyes:      Extraocular Movements: Extraocular movements intact. Cardiovascular:      Rate and Rhythm: Normal rate. Pulmonary:      Effort: Pulmonary effort is normal.   Abdominal:      General: There is no distension. Palpations: Abdomen is soft. Tenderness: There is no abdominal tenderness. Musculoskeletal:         General: Normal range of motion. Cervical back: Normal range of motion. Neurological:      General: No focal deficit present. Mental Status: She is alert and oriented to person, place, and time. Skin:     General: Skin is warm and dry. Psychiatric:         Mood and Affect: Mood normal.         Behavior: Behavior normal.         Thought Content: Thought content normal.         Judgment: Judgment normal.   Chaperone present: chaperone declined. Assessment and Plan          Diagnosis Orders   1.  Women's annual routine gynecological examination                  I am having Hoda Barnes maintain her Calcium, Vitamin D3, fexofenadine, Mucinex DM, b complex vitamins, (Doxylamine Succinate, Sleep, (SLEEP AID PO)), (diphenhydrAMINE HCl, Sleep, (ZZZQUIL PO)), fluticasone, traMADol, acetaminophen, Denosumab (PROLIA SC), ondansetron, lisinopril, ALPRAZolam, escitalopram, and BIOTIN PO. Return in about 2 years (around 10/19/2024) for annual.    She was also counseled on her preventative health maintenance recommendations and follow-up. There are no Patient Instructions on file for this visit.     Paul Cross PA-C,10/19/2022 9:51 AM

## 2023-01-23 ENCOUNTER — OFFICE VISIT (OUTPATIENT)
Dept: ONCOLOGY | Age: 68
End: 2023-01-23
Payer: MEDICARE

## 2023-01-23 VITALS
TEMPERATURE: 96 F | BODY MASS INDEX: 22.91 KG/M2 | HEIGHT: 67 IN | HEART RATE: 85 BPM | RESPIRATION RATE: 18 BRPM | SYSTOLIC BLOOD PRESSURE: 146 MMHG | WEIGHT: 146 LBS | DIASTOLIC BLOOD PRESSURE: 84 MMHG

## 2023-01-23 DIAGNOSIS — C50.912 MALIGNANT NEOPLASM OF LEFT BREAST IN FEMALE, ESTROGEN RECEPTOR POSITIVE, UNSPECIFIED SITE OF BREAST (HCC): Primary | ICD-10-CM

## 2023-01-23 DIAGNOSIS — Z90.12 S/P LEFT MASTECTOMY: ICD-10-CM

## 2023-01-23 DIAGNOSIS — Z17.0 MALIGNANT NEOPLASM OF LEFT BREAST IN FEMALE, ESTROGEN RECEPTOR POSITIVE, UNSPECIFIED SITE OF BREAST (HCC): Primary | ICD-10-CM

## 2023-01-23 DIAGNOSIS — M80.08XA OSTEOPOROSIS WITH PATHOLOGICAL FRACTURE OF LUMBAR VERTEBRA (HCC): ICD-10-CM

## 2023-01-23 PROCEDURE — 99214 OFFICE O/P EST MOD 30 MIN: CPT | Performed by: INTERNAL MEDICINE

## 2023-01-23 PROCEDURE — 3079F DIAST BP 80-89 MM HG: CPT | Performed by: INTERNAL MEDICINE

## 2023-01-23 PROCEDURE — 1123F ACP DISCUSS/DSCN MKR DOCD: CPT | Performed by: INTERNAL MEDICINE

## 2023-01-23 PROCEDURE — 3077F SYST BP >= 140 MM HG: CPT | Performed by: INTERNAL MEDICINE

## 2023-01-23 NOTE — PROGRESS NOTES
Reason for the visit:   Chief Complaint   Patient presents with    Follow-up     Neuropathy associated with cancer/breast cancer tx       Pertinent Clinical Problems/ Treatments:    Left sided breast cancer,[id number], ER positive/[place] positive/HER-2 positive,  S/p left mastectomy , pt choice,  S/p Dose dense AC x 4  Pt started weekly herceptin /taxol on 7/8/14,  week 12 done 9/23/14. Maintenance Herceptin every 3 weeks , completed one year of therapy. Osteoporosis with T score of -3.1, we'll continue vitamin D/calcium and Prolia   Status post adjuvant hormone therapy, initially started on Arimidex then switched to Aromasin discontinued May 2019 due to increasing side effects with fatigue. Summary of the case/HPI :  The patient who had no major comorbidities was diagnosed to have left sided breast cancer. It was ER positive/MO positive/HER-2 positive, she elected to undergo left mastectomy. patient subsequently started on adjuvant chemotherapy. She received 4 doses of dose dense AC. She has handled  chemotherapy well. She completed one year of herceptin. And after chemotherapy she was maintained of aromatse inhibitor. She developed osteoporosis and was started on prolia   Chronic back pain and she had compression fracture status post kyphoplasty in 20    Interim history:  Chronic back pain  Persistent lower extremity neuropathy  Status post kyphoplasty  Did have appendectomy  Mammogram and bone density scan was not done    Past Medical History   has a past medical history of Anxiety, Bone spur, Breast cancer ([address] Depression, Diverticulosis of large intestine without hemorrhage, Essential hypertension, Osteoarthritis, Seasonal allergies, Wears dentures, and Wrist fracture. Surgical History   has a past surgical history that includes Carpal tunnel release (2005); other surgical history (approx 2005); Mastectomy (Left, 03/18/2014); Tunneled venous port placement (Right, 04/30/2014);  Breast surgery (Left, 03/18/2014); Breast biopsy (02/18/2014); Breast reconstruction (04/2015); other surgical history; Breast reconstruction (Bilateral); Bunionectomy (Left, 11/08/2017); Bunionectomy (Left, 11/08/2017); pr colon ca scrn not hi rsk ind (N/A, 12/04/2017); Colonoscopy (12/04/2017); back surgery; and laparoscopic appendectomy (N/A, 07/18/2022). Home Medications  has a current medication list which includes the following prescription(s): escitalopram, pantoprazole, alprazolam, biotin, lisinopril, denosumab, acetaminophen, fluticasone, b complex vitamins, mucinex dm, fexofenadine, calcium, vitamin d3, itraconazole, [DISCONTINUED] prochlorperazine, and [DISCONTINUED] aspirin. Allergies:  No known allergies        Review of Systems :      Constitutional: No fever or chills. No night sweats, progressive fatigue  HEENT: negative for sore mouth, sore throat, hoarseness and voice change   Respiratory: negative for cough , sputum, dyspnea, wheezing, hemoptysis, chest pain   Cardiovascular: negative for chest pain, dyspnea, palpitations, orthopnea, PND   Gastrointestinal: Mild intermittent nausea, no vomiting, diarrhea, constipation, abdominal pain,   Genitourinary: negative for frequency, dysuria, nocturia, urinary incontinence, and hematuria   Hematologic/Lymphatic: negative for easy bruising, bleeding, lymphadenopathy, petechiae and swelling/edema   Endocrine: negative for heat or cold intolerance, tremor, weight changes, change in bowel habits and hair loss   Musculoskeletal: Mild aches and pains since he started hormone therapy back pain. Neurological: Neuropathy.       Physical Examination :       BP (!) 146/84 (Site: Right Upper Arm, Position: Sitting, Cuff Size: Medium Adult)   Pulse 85   Temp (!) 96 °F (35.6 °C) (Temporal)   Resp 18   Ht 5' 7\" (1.702 m)   Wt 146 lb (66.2 kg)   LMP  (LMP Unknown)   BMI 22.87 kg/m²     Performance Status:Estimated performance status is ECOG 0    General appearance - well appearing, no in pain or distress ,alopecai   Mental status - alert and cooperative   Neck - supple, right submandibular swelling about 1 cm, softer and less tender than before  Mouth exam: No masses could be seen, no ulcers   Lymphatics - no palpable lymphadenopathy, no hepatosplenomegaly   Chest - clear to auscultation, no wheezes, rales or rhonchi, symmetric air entry   Left side mastectomy s/p reconstruction. No axillary LN enlargement. Right breast exam: No masses felt and no axillary adenopathy. S/p breast reduction surgery. Heart - normal rate, regular rhythm, normal S1, S2, no murmurs,  Abdomen - soft, nontender, nondistended, no masses or organomegaly   Neurological - alert, oriented, normal speech, no focal findings or movement disorder noted   Musculoskeletal - no joint tenderness, deformity or swelling   Extremities - peripheral pulses normal, no pedal edema, no clubbing or cyanosis   Skin - normal coloration and turgor, no rashes, no suspicious skin lesions noted        Mamm 11/19  Impression   No mammographic evidence of malignancy. BI-RADS 1       BIRADS:   BIRADS - CATEGORY 1       Negative, no evidence of malignancy. Normal interval follow-up is   recommended in 12 months. OVERALL ASSESSMENT - NEGATIVE       A letter of notification will be sent to the patient regarding the results. Assessment:    Left side stage I breast cancer, ER positive/[place] positive/HER-2 positive, status post left mastectomy. She finished 4 cycles of dose dense AC. She received 12 weeks of weekly Taxol and Herceptin concluded on Sep 23, 2014. She was given 15 cycles of single agent Herceptin that concluded June 23, 2015 and also is taking Aromasin. Discontinued May 2019. No evidence of local recurrence or new primary according to the mammography. 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 Followed in pain clinic.    Severe osteoporosis with T score of -3.1, patient take vitamin D and calcium and also will continue Prolia  60 mg every 6 months. Back pain. Compression fracture of L3. Status post kyphoplasty      Plan:   Status post aromatase inhibitors  Due for mammogram and bone density scan  Continue Prolia calcium and vitamin D  RTC in 6 months sooner if abnormal mammogram                                    [facility]                            This note is created with the assistance of a speech recognition program.  While intending to generate a document that actually reflects the content of the visit, the document can still have some errors including those of syntax and sound a like substitutions which may escape proof reading. It such instances, actual meaning can be extrapolated by contextual diversion.

## 2023-02-22 ENCOUNTER — HOSPITAL ENCOUNTER (OUTPATIENT)
Dept: WOMENS IMAGING | Age: 68
Discharge: HOME | End: 2023-02-24
Payer: MEDICARE

## 2023-02-22 DIAGNOSIS — M80.08XA OSTEOPOROSIS WITH PATHOLOGICAL FRACTURE OF LUMBAR VERTEBRA (HCC): ICD-10-CM

## 2023-02-22 DIAGNOSIS — Z12.31 BREAST CANCER SCREENING BY MAMMOGRAM: ICD-10-CM

## 2023-02-22 PROCEDURE — 77080 DXA BONE DENSITY AXIAL: CPT

## 2023-02-22 PROCEDURE — 77067 SCR MAMMO BI INCL CAD: CPT

## 2023-03-13 ENCOUNTER — HOSPITAL ENCOUNTER (OUTPATIENT)
Dept: INFUSION THERAPY | Age: 68
Discharge: HOME | End: 2023-03-13
Payer: MEDICARE

## 2023-03-13 DIAGNOSIS — M80.08XA OSTEOPOROSIS WITH PATHOLOGICAL FRACTURE OF LUMBAR VERTEBRA (HCC): Primary | ICD-10-CM

## 2023-03-13 PROCEDURE — 6360000002 HC RX W HCPCS: Performed by: INTERNAL MEDICINE

## 2023-03-13 PROCEDURE — 96372 THER/PROPH/DIAG INJ SC/IM: CPT

## 2023-03-13 RX ORDER — METHYLPREDNISOLONE SODIUM SUCCINATE 125 MG/2ML
125 INJECTION, POWDER, LYOPHILIZED, FOR SOLUTION INTRAMUSCULAR; INTRAVENOUS ONCE
OUTPATIENT
Start: 2023-08-29 | End: 2023-08-29

## 2023-03-13 RX ORDER — 0.9 % SODIUM CHLORIDE 0.9 %
10 VIAL (ML) INJECTION ONCE
OUTPATIENT
Start: 2023-08-29 | End: 2023-08-29

## 2023-03-13 RX ORDER — SODIUM CHLORIDE 9 MG/ML
100 INJECTION, SOLUTION INTRAVENOUS CONTINUOUS
OUTPATIENT
Start: 2023-08-29

## 2023-03-13 RX ORDER — FAMOTIDINE 10 MG/ML
20 INJECTION, SOLUTION INTRAVENOUS ONCE
OUTPATIENT
Start: 2023-08-29 | End: 2023-08-29

## 2023-03-13 RX ORDER — DIPHENHYDRAMINE HYDROCHLORIDE 50 MG/ML
50 INJECTION INTRAMUSCULAR; INTRAVENOUS ONCE
OUTPATIENT
Start: 2023-08-29 | End: 2023-08-29

## 2023-03-13 RX ADMIN — DENOSUMAB 60 MG: 60 INJECTION SUBCUTANEOUS at 13:19

## 2023-03-13 NOTE — PLAN OF CARE
Problem: KNOWLEDGE DEFICIT  Goal: Patient/S.O. demonstrates understanding of disease process, treatment plan, medications, and discharge instructions.   Outcome: Adequate for Discharge     Problem: Pain:  Goal: Pain level will decrease  Description: Pain level will decrease  Outcome: Adequate for Discharge  Goal: Control of acute pain  Description: Control of acute pain  Outcome: Adequate for Discharge  Goal: Control of chronic pain  Description: Control of chronic pain  Outcome: Adequate for Discharge

## 2023-07-10 ENCOUNTER — HOSPITAL ENCOUNTER (OUTPATIENT)
Age: 68
Discharge: HOME | End: 2023-07-10
Payer: MEDICARE

## 2023-07-10 DIAGNOSIS — I10 ESSENTIAL HYPERTENSION: ICD-10-CM

## 2023-07-10 DIAGNOSIS — Z79.899 CHRONIC PRESCRIPTION BENZODIAZEPINE USE: ICD-10-CM

## 2023-07-10 LAB
ALBUMIN SERPL-MCNC: 4.2 G/DL (ref 3.5–5.2)
ALBUMIN/GLOB SERPL: 1.3 {RATIO} (ref 1–2.5)
ALP SERPL-CCNC: 69 U/L (ref 35–104)
ALT SERPL-CCNC: 24 U/L (ref 5–33)
AMPHET UR QL SCN: NEGATIVE
ANION GAP SERPL CALCULATED.3IONS-SCNC: 10 MMOL/L (ref 9–17)
AST SERPL-CCNC: 22 U/L
BARBITURATES UR QL SCN: NEGATIVE
BENZODIAZ UR QL: NEGATIVE
BILIRUB SERPL-MCNC: 0.3 MG/DL (ref 0.3–1.2)
BUN SERPL-MCNC: 13 MG/DL (ref 8–23)
BUN/CREAT SERPL: 16 (ref 9–20)
BUPRENORPHINE UR QL: NEGATIVE
CALCIUM SERPL-MCNC: 9.1 MG/DL (ref 8.6–10.4)
CANNABINOIDS UR QL SCN: NEGATIVE
CHLORIDE SERPL-SCNC: 103 MMOL/L (ref 98–107)
CO2 SERPL-SCNC: 27 MMOL/L (ref 20–31)
COCAINE UR QL SCN: NEGATIVE
CREAT SERPL-MCNC: 0.8 MG/DL (ref 0.5–0.9)
FENTANYL UR QL: NEGATIVE
GFR SERPL CREATININE-BSD FRML MDRD: >60 ML/MIN/1.73M2
GLUCOSE P FAST SERPL-MCNC: 110 MG/DL (ref 70–99)
METHADONE UR QL: NEGATIVE
OPIATES UR QL SCN: NEGATIVE
OXYCODONE UR QL SCN: NEGATIVE
PCP UR QL SCN: NEGATIVE
POTASSIUM SERPL-SCNC: 4.1 MMOL/L (ref 3.7–5.3)
PROT SERPL-MCNC: 7.4 G/DL (ref 6.4–8.3)
SODIUM SERPL-SCNC: 140 MMOL/L (ref 135–144)
TEST INFORMATION: NORMAL

## 2023-07-10 PROCEDURE — 80307 DRUG TEST PRSMV CHEM ANLYZR: CPT

## 2023-07-10 PROCEDURE — 80053 COMPREHEN METABOLIC PANEL: CPT

## 2023-07-10 PROCEDURE — 36415 COLL VENOUS BLD VENIPUNCTURE: CPT

## 2023-07-20 ENCOUNTER — OFFICE VISIT (OUTPATIENT)
Dept: ONCOLOGY | Age: 68
End: 2023-07-20
Payer: MEDICARE

## 2023-07-20 VITALS
HEART RATE: 64 BPM | RESPIRATION RATE: 14 BRPM | TEMPERATURE: 96.1 F | WEIGHT: 151 LBS | SYSTOLIC BLOOD PRESSURE: 167 MMHG | BODY MASS INDEX: 23.65 KG/M2 | DIASTOLIC BLOOD PRESSURE: 73 MMHG

## 2023-07-20 DIAGNOSIS — G63 NEUROPATHY ASSOCIATED WITH CANCER (HCC): ICD-10-CM

## 2023-07-20 DIAGNOSIS — C80.1 NEUROPATHY ASSOCIATED WITH CANCER (HCC): ICD-10-CM

## 2023-07-20 DIAGNOSIS — Z17.0 MALIGNANT NEOPLASM OF LEFT BREAST IN FEMALE, ESTROGEN RECEPTOR POSITIVE, UNSPECIFIED SITE OF BREAST (HCC): Primary | ICD-10-CM

## 2023-07-20 DIAGNOSIS — C50.912 MALIGNANT NEOPLASM OF LEFT BREAST IN FEMALE, ESTROGEN RECEPTOR POSITIVE, UNSPECIFIED SITE OF BREAST (HCC): Primary | ICD-10-CM

## 2023-07-20 DIAGNOSIS — M85.89 OSTEOPENIA OF MULTIPLE SITES: ICD-10-CM

## 2023-07-20 PROCEDURE — 3078F DIAST BP <80 MM HG: CPT | Performed by: INTERNAL MEDICINE

## 2023-07-20 PROCEDURE — 1123F ACP DISCUSS/DSCN MKR DOCD: CPT | Performed by: INTERNAL MEDICINE

## 2023-07-20 PROCEDURE — 99214 OFFICE O/P EST MOD 30 MIN: CPT | Performed by: INTERNAL MEDICINE

## 2023-07-20 PROCEDURE — 3077F SYST BP >= 140 MM HG: CPT | Performed by: INTERNAL MEDICINE

## 2023-07-20 NOTE — PROGRESS NOTES
Reason for the visit:   Chief Complaint   Patient presents with    Follow-up     Review of disease process       Pertinent Clinical Problems/ Treatments:    Left sided breast cancer,[id number], ER positive/[place] positive/HER-2 positive,  S/p left mastectomy , pt choice,  S/p Dose dense AC x 4  Pt started weekly herceptin /taxol on 7/8/14,  week 12 done 9/23/14. Maintenance Herceptin every 3 weeks , completed one year of therapy. Osteoporosis with T score of -3.1, we'll continue vitamin D/calcium and Prolia   Status post adjuvant hormone therapy, initially started on Arimidex then switched to Aromasin discontinued May 2019 due to increasing side effects with fatigue. Summary of the case/HPI :  The patient who had no major comorbidities was diagnosed to have left sided breast cancer. It was ER positive/ID positive/HER-2 positive, she elected to undergo left mastectomy. patient subsequently started on adjuvant chemotherapy. She received 4 doses of dose dense AC. She has handled  chemotherapy well. She completed one year of herceptin. And after chemotherapy she was maintained of aromatse inhibitor. She developed osteoporosis and was started on prolia   Chronic back pain and she had compression fracture status post kyphoplasty in 20    Interim history:  Chronic back pain  Persistent lower extremity neuropathy  Status post kyphoplasty  Mammogram done in February and no evidence of recurrence  Bone density scan done on February and did show osteopenia    Past Medical History   has a past medical history of Anxiety, Bone spur, Breast cancer ([address]), Depression, Diverticulosis of large intestine without hemorrhage, Essential hypertension, Osteoarthritis, Seasonal allergies, Wears dentures, and Wrist fracture. Surgical History   has a past surgical history that includes Carpal tunnel release (2005); other surgical history (approx 2005); Mastectomy (Left, 03/18/2014);  Tunneled venous port placement

## 2023-09-11 ENCOUNTER — CLINICAL DOCUMENTATION (OUTPATIENT)
Facility: HOSPITAL | Age: 68
End: 2023-09-11

## 2023-09-14 ENCOUNTER — HOSPITAL ENCOUNTER (OUTPATIENT)
Dept: INFUSION THERAPY | Age: 68
Discharge: HOME | End: 2023-09-14
Payer: MEDICARE

## 2023-09-14 VITALS
TEMPERATURE: 97.1 F | DIASTOLIC BLOOD PRESSURE: 52 MMHG | HEART RATE: 72 BPM | RESPIRATION RATE: 18 BRPM | SYSTOLIC BLOOD PRESSURE: 119 MMHG

## 2023-09-14 PROCEDURE — 6360000002 HC RX W HCPCS: Performed by: INTERNAL MEDICINE

## 2023-09-14 PROCEDURE — 96372 THER/PROPH/DIAG INJ SC/IM: CPT

## 2023-09-14 RX ORDER — SODIUM CHLORIDE 9 MG/ML
100 INJECTION, SOLUTION INTRAVENOUS CONTINUOUS
OUTPATIENT
Start: 2023-09-14

## 2023-09-14 RX ORDER — METHYLPREDNISOLONE SODIUM SUCCINATE 125 MG/2ML
125 INJECTION, POWDER, LYOPHILIZED, FOR SOLUTION INTRAMUSCULAR; INTRAVENOUS ONCE
OUTPATIENT
Start: 2023-09-14 | End: 2023-09-14

## 2023-09-14 RX ORDER — FAMOTIDINE 10 MG/ML
20 INJECTION, SOLUTION INTRAVENOUS ONCE
OUTPATIENT
Start: 2023-09-14 | End: 2023-09-14

## 2023-09-14 RX ORDER — DIPHENHYDRAMINE HYDROCHLORIDE 50 MG/ML
50 INJECTION INTRAMUSCULAR; INTRAVENOUS ONCE
OUTPATIENT
Start: 2023-09-14 | End: 2023-09-14

## 2023-09-14 RX ADMIN — DENOSUMAB 60 MG: 60 INJECTION SUBCUTANEOUS at 14:18

## 2023-09-26 ENCOUNTER — CLINICAL DOCUMENTATION (OUTPATIENT)
Facility: HOSPITAL | Age: 68
End: 2023-09-26

## 2023-09-26 NOTE — PROGRESS NOTES
Patient Assistance    Met with: patient    Navigator Type:  Infusion  Documentation Type: Assistance Review  Contact Type: Telephone  Navigation Status: Copay Enrollment  Status of Patient Insurance Coverage: Patient has active coverage          Additional notes: Patient stated that she doesn't need assistance from the [address] [place] [address] and therefore will not have to be put on the waitlist.    Drug Name: OTHER  Other Drug Name: Prolia  Form of PAP Assistance: Other

## 2023-12-13 PROBLEM — Q66.219: Status: RESOLVED | Noted: 2017-11-07 | Resolved: 2023-12-13

## 2023-12-13 PROBLEM — Q66.6: Status: RESOLVED | Noted: 2017-11-07 | Resolved: 2023-12-13

## 2023-12-13 PROBLEM — E44.0 MODERATE MALNUTRITION (HCC): Status: RESOLVED | Noted: 2022-07-18 | Resolved: 2023-12-13

## 2023-12-13 PROBLEM — K37 APPENDICITIS: Status: RESOLVED | Noted: 2022-07-18 | Resolved: 2023-12-13

## 2023-12-26 ENCOUNTER — HOSPITAL ENCOUNTER (OUTPATIENT)
Age: 68
Discharge: HOME | End: 2023-12-26
Payer: MEDICARE

## 2023-12-26 LAB
ALBUMIN SERPL-MCNC: 4.1 G/DL (ref 3.5–5.2)
ALBUMIN/GLOB SERPL: 1.4 {RATIO} (ref 1–2.5)
ALP SERPL-CCNC: 85 U/L (ref 35–104)
ALT SERPL-CCNC: 10 U/L (ref 5–33)
AST SERPL-CCNC: 14 U/L
BILIRUB DIRECT SERPL-MCNC: <0.1 MG/DL
BILIRUB INDIRECT SERPL-MCNC: NORMAL MG/DL (ref 0–1)
BILIRUB SERPL-MCNC: 0.3 MG/DL (ref 0.3–1.2)
PROT SERPL-MCNC: 7 G/DL (ref 6.4–8.3)

## 2023-12-26 PROCEDURE — 80076 HEPATIC FUNCTION PANEL: CPT

## 2023-12-26 PROCEDURE — 36415 COLL VENOUS BLD VENIPUNCTURE: CPT

## 2024-02-23 ENCOUNTER — HOSPITAL ENCOUNTER (OUTPATIENT)
Dept: WOMENS IMAGING | Age: 69
End: 2024-02-23
Payer: MEDICARE

## 2024-02-23 DIAGNOSIS — Z12.31 SCREENING MAMMOGRAM FOR BREAST CANCER: ICD-10-CM

## 2024-02-23 PROCEDURE — 77063 BREAST TOMOSYNTHESIS BI: CPT

## 2024-03-07 ENCOUNTER — OFFICE VISIT (OUTPATIENT)
Dept: ONCOLOGY | Age: 69
End: 2024-03-07
Payer: MEDICARE

## 2024-03-07 VITALS
WEIGHT: 151 LBS | SYSTOLIC BLOOD PRESSURE: 144 MMHG | RESPIRATION RATE: 18 BRPM | DIASTOLIC BLOOD PRESSURE: 67 MMHG | HEART RATE: 70 BPM | BODY MASS INDEX: 23.65 KG/M2 | TEMPERATURE: 97.2 F

## 2024-03-07 DIAGNOSIS — D64.9 NORMOCYTIC ANEMIA: ICD-10-CM

## 2024-03-07 DIAGNOSIS — G63 NEUROPATHY ASSOCIATED WITH CANCER (HCC): ICD-10-CM

## 2024-03-07 DIAGNOSIS — C80.1 NEUROPATHY ASSOCIATED WITH CANCER (HCC): ICD-10-CM

## 2024-03-07 DIAGNOSIS — Z17.0 MALIGNANT NEOPLASM OF LEFT BREAST IN FEMALE, ESTROGEN RECEPTOR POSITIVE, UNSPECIFIED SITE OF BREAST (HCC): Primary | ICD-10-CM

## 2024-03-07 DIAGNOSIS — M80.08XA OSTEOPOROSIS WITH PATHOLOGICAL FRACTURE OF LUMBAR VERTEBRA (HCC): ICD-10-CM

## 2024-03-07 DIAGNOSIS — C50.912 MALIGNANT NEOPLASM OF LEFT BREAST IN FEMALE, ESTROGEN RECEPTOR POSITIVE, UNSPECIFIED SITE OF BREAST (HCC): Primary | ICD-10-CM

## 2024-03-07 PROCEDURE — 99214 OFFICE O/P EST MOD 30 MIN: CPT | Performed by: INTERNAL MEDICINE

## 2024-03-07 PROCEDURE — 1123F ACP DISCUSS/DSCN MKR DOCD: CPT | Performed by: INTERNAL MEDICINE

## 2024-03-07 PROCEDURE — 3078F DIAST BP <80 MM HG: CPT | Performed by: INTERNAL MEDICINE

## 2024-03-07 PROCEDURE — 3077F SYST BP >= 140 MM HG: CPT | Performed by: INTERNAL MEDICINE

## 2024-03-07 NOTE — PROGRESS NOTES
Reason for the visit:   Chief Complaint   Patient presents with    Follow-up     Breast cancer       Pertinent Clinical Problems/ Treatments:    Left sided breast cancer,[id number], ER positive/[place] positive/HER-2 positive,  S/p left mastectomy , pt choice,  S/p Dose dense AC x 4  Pt started weekly herceptin /taxol on 7/8/14,  week 12 done 9/23/14. Maintenance Herceptin every 3 weeks , completed one year of therapy.    Osteoporosis with T score of -3.1, we'll continue vitamin D/calcium and Prolia   Status post adjuvant hormone therapy, initially started on Arimidex then switched to Aromasin discontinued May 2019 due to increasing side effects with fatigue.    Summary of the case/HPI :  The patient who had no major comorbidities was diagnosed to have left sided breast cancer.  It was ER positive/[place] positive/HER-2 positive, she elected to undergo left mastectomy.patient subsequently started on adjuvant chemotherapy.  She received 4 doses of dose dense AC.  She has handled  chemotherapy well.  She completed one year of herceptin. And after chemotherapy she was maintained of aromatse inhibitor.   She developed osteoporosis and was started on prolia   Chronic back pain and she had compression fracture status post kyphoplasty in 20    Interim history:  Chronic back pain  Persistent lower extremity neuropathy  Status post kyphoplasty  Mammogram done in February and no evidence of recurrence  Bone density scan done on February and did show osteopenia calcium and vitamin D  No recent CBC    Past Medical History   has a past medical history of Anxiety, Bone spur, Breast cancer (HCC), Depression, Diverticulosis of large intestine without hemorrhage, Essential hypertension, Osteoarthritis, Seasonal allergies, Wears dentures, and Wrist fracture.    Surgical History   has a past surgical history that includes Carpal tunnel release (2005); other surgical history (approx 2005); Mastectomy (Left, 03/18/2014); Tunneled

## 2024-03-14 ENCOUNTER — HOSPITAL ENCOUNTER (OUTPATIENT)
Dept: INFUSION THERAPY | Age: 69
Discharge: HOME | End: 2024-03-14
Payer: MEDICARE

## 2024-03-14 VITALS
DIASTOLIC BLOOD PRESSURE: 86 MMHG | SYSTOLIC BLOOD PRESSURE: 126 MMHG | HEART RATE: 76 BPM | TEMPERATURE: 97.6 F | RESPIRATION RATE: 18 BRPM

## 2024-03-14 PROCEDURE — 6360000002 HC RX W HCPCS: Performed by: INTERNAL MEDICINE

## 2024-03-14 PROCEDURE — 96372 THER/PROPH/DIAG INJ SC/IM: CPT

## 2024-03-14 RX ADMIN — DENOSUMAB 60 MG: 60 INJECTION SUBCUTANEOUS at 13:37

## 2024-08-27 DIAGNOSIS — C50.912 MALIGNANT NEOPLASM OF LEFT BREAST IN FEMALE, ESTROGEN RECEPTOR POSITIVE, UNSPECIFIED SITE OF BREAST (HCC): Primary | ICD-10-CM

## 2024-08-27 DIAGNOSIS — Z17.0 MALIGNANT NEOPLASM OF LEFT BREAST IN FEMALE, ESTROGEN RECEPTOR POSITIVE, UNSPECIFIED SITE OF BREAST (HCC): Primary | ICD-10-CM

## 2024-08-27 DIAGNOSIS — D64.9 NORMOCYTIC ANEMIA: ICD-10-CM

## 2024-09-20 ENCOUNTER — HOSPITAL ENCOUNTER (OUTPATIENT)
Dept: INFUSION THERAPY | Age: 69
Discharge: HOME | End: 2024-09-20
Payer: MEDICARE

## 2024-09-20 VITALS
SYSTOLIC BLOOD PRESSURE: 134 MMHG | TEMPERATURE: 98 F | DIASTOLIC BLOOD PRESSURE: 80 MMHG | RESPIRATION RATE: 18 BRPM | HEART RATE: 66 BPM

## 2024-09-20 PROCEDURE — 6360000002 HC RX W HCPCS: Performed by: INTERNAL MEDICINE

## 2024-09-20 PROCEDURE — 96372 THER/PROPH/DIAG INJ SC/IM: CPT

## 2024-09-20 RX ADMIN — DENOSUMAB 60 MG: 60 INJECTION SUBCUTANEOUS at 09:21

## 2024-10-08 ENCOUNTER — HOSPITAL ENCOUNTER (OUTPATIENT)
Age: 69
Discharge: HOME | End: 2024-10-08
Payer: MEDICARE

## 2024-10-08 DIAGNOSIS — C50.912 MALIGNANT NEOPLASM OF LEFT BREAST IN FEMALE, ESTROGEN RECEPTOR POSITIVE, UNSPECIFIED SITE OF BREAST (HCC): ICD-10-CM

## 2024-10-08 DIAGNOSIS — D64.9 NORMOCYTIC ANEMIA: ICD-10-CM

## 2024-10-08 DIAGNOSIS — Z17.0 MALIGNANT NEOPLASM OF LEFT BREAST IN FEMALE, ESTROGEN RECEPTOR POSITIVE, UNSPECIFIED SITE OF BREAST (HCC): ICD-10-CM

## 2024-10-08 LAB
ALBUMIN SERPL-MCNC: 4.4 G/DL (ref 3.5–5.2)
ALBUMIN/GLOB SERPL: 1.4 {RATIO} (ref 1–2.5)
ALP SERPL-CCNC: 75 U/L (ref 35–104)
ALT SERPL-CCNC: 13 U/L (ref 10–35)
ANION GAP SERPL CALCULATED.3IONS-SCNC: 10 MMOL/L (ref 9–16)
AST SERPL-CCNC: 19 U/L (ref 10–35)
BASOPHILS # BLD: 0.04 K/UL (ref 0–0.2)
BASOPHILS NFR BLD: 0 % (ref 0–2)
BILIRUB SERPL-MCNC: 0.3 MG/DL (ref 0–1.2)
BUN SERPL-MCNC: 11 MG/DL (ref 8–23)
BUN/CREAT SERPL: 14 (ref 9–20)
CALCIUM SERPL-MCNC: 9.7 MG/DL (ref 8.6–10.4)
CHLORIDE SERPL-SCNC: 101 MMOL/L (ref 98–107)
CO2 SERPL-SCNC: 27 MMOL/L (ref 20–31)
CREAT SERPL-MCNC: 0.8 MG/DL (ref 0.5–0.9)
EOSINOPHIL # BLD: 0.17 K/UL (ref 0–0.44)
EOSINOPHILS RELATIVE PERCENT: 2 % (ref 1–4)
ERYTHROCYTE [DISTWIDTH] IN BLOOD BY AUTOMATED COUNT: 13.8 % (ref 11.8–14.4)
GFR, ESTIMATED: 76 ML/MIN/1.73M2
GLUCOSE SERPL-MCNC: 109 MG/DL (ref 74–99)
HCT VFR BLD AUTO: 43 % (ref 36.3–47.1)
HGB BLD-MCNC: 14.1 G/DL (ref 11.9–15.1)
IMM GRANULOCYTES # BLD AUTO: <0.03 K/UL (ref 0–0.3)
IMM GRANULOCYTES NFR BLD: 0 %
LYMPHOCYTES NFR BLD: 3.12 K/UL (ref 1.1–3.7)
LYMPHOCYTES RELATIVE PERCENT: 32 % (ref 24–43)
MCH RBC QN AUTO: 30.5 PG (ref 25.2–33.5)
MCHC RBC AUTO-ENTMCNC: 32.8 G/DL (ref 28.4–34.8)
MCV RBC AUTO: 92.9 FL (ref 82.6–102.9)
MONOCYTES NFR BLD: 0.57 K/UL (ref 0.1–1.2)
MONOCYTES NFR BLD: 6 % (ref 3–12)
NEUTROPHILS NFR BLD: 60 % (ref 36–65)
NEUTS SEG NFR BLD: 5.75 K/UL (ref 1.5–8.1)
NRBC BLD-RTO: 0 PER 100 WBC
PLATELET # BLD AUTO: 209 K/UL (ref 138–453)
PMV BLD AUTO: 10.2 FL (ref 8.1–13.5)
POTASSIUM SERPL-SCNC: 4.1 MMOL/L (ref 3.7–5.3)
PROT SERPL-MCNC: 7.6 G/DL (ref 6.6–8.7)
RBC # BLD AUTO: 4.63 M/UL (ref 3.95–5.11)
SODIUM SERPL-SCNC: 138 MMOL/L (ref 136–145)
WBC OTHER # BLD: 9.7 K/UL (ref 3.5–11.3)

## 2024-10-08 PROCEDURE — 80053 COMPREHEN METABOLIC PANEL: CPT

## 2024-10-08 PROCEDURE — 85025 COMPLETE CBC W/AUTO DIFF WBC: CPT

## 2024-10-08 PROCEDURE — 36415 COLL VENOUS BLD VENIPUNCTURE: CPT

## 2024-11-07 ENCOUNTER — OFFICE VISIT (OUTPATIENT)
Dept: ONCOLOGY | Age: 69
End: 2024-11-07

## 2024-11-07 VITALS
BODY MASS INDEX: 23.39 KG/M2 | HEIGHT: 67 IN | TEMPERATURE: 97.5 F | WEIGHT: 149 LBS | SYSTOLIC BLOOD PRESSURE: 124 MMHG | DIASTOLIC BLOOD PRESSURE: 76 MMHG | HEART RATE: 64 BPM | RESPIRATION RATE: 18 BRPM

## 2024-11-07 DIAGNOSIS — Z17.0 MALIGNANT NEOPLASM OF LEFT BREAST IN FEMALE, ESTROGEN RECEPTOR POSITIVE, UNSPECIFIED SITE OF BREAST (HCC): Primary | ICD-10-CM

## 2024-11-07 DIAGNOSIS — Z85.3 H/O MALIGNANT NEOPLASM OF BREAST: ICD-10-CM

## 2024-11-07 DIAGNOSIS — M85.80 OSTEOPENIA DUE TO CANCER THERAPY: ICD-10-CM

## 2024-11-07 DIAGNOSIS — C50.912 MALIGNANT NEOPLASM OF LEFT BREAST IN FEMALE, ESTROGEN RECEPTOR POSITIVE, UNSPECIFIED SITE OF BREAST (HCC): Primary | ICD-10-CM

## 2024-11-07 DIAGNOSIS — C80.1 NEUROPATHY ASSOCIATED WITH CANCER (HCC): ICD-10-CM

## 2024-11-07 DIAGNOSIS — G63 NEUROPATHY ASSOCIATED WITH CANCER (HCC): ICD-10-CM

## 2024-11-07 RX ORDER — ALENDRONATE SODIUM 70 MG/1
70 TABLET ORAL
Qty: 4 TABLET | Refills: 3 | Status: SHIPPED | OUTPATIENT
Start: 2024-11-07

## 2024-11-07 NOTE — PROGRESS NOTES
04/30/2014); Breast surgery (Left, 03/18/2014); Breast biopsy (02/18/2014); Breast reconstruction (04/2015); other surgical history; Breast reconstruction (Bilateral); Bunionectomy (Left, 11/08/2017); Bunionectomy (Left, 11/08/2017); pr colon ca scrn not hi rsk ind (N/A, 12/04/2017); Colonoscopy (12/04/2017); back surgery; and laparoscopic appendectomy (N/A, 07/18/2022).      Home Medications  has a current medication list which includes the following prescription(s): alprazolam, sertraline, lisinopril, ciclopirox, pantoprazole, atenolol, vitamin c, fluorouracil, denosumab, acetaminophen, fluticasone, [DISCONTINUED] prochlorperazine, mucinex dm, fexofenadine, calcium, and vitamin d3.    Allergies:  No known allergies        Review of Systems :      Constitutional: No fever or chills. No night sweats, progressive fatigue  HEENT: negative for sore mouth, sore throat, hoarseness and voice change   Respiratory: negative for cough , sputum, dyspnea, wheezing, hemoptysis, chest pain   Cardiovascular: negative for chest pain, dyspnea, palpitations, orthopnea, PND   Gastrointestinal: Mild intermittent nausea, no vomiting, diarrhea, constipation, abdominal pain,   Genitourinary: negative for frequency, dysuria, nocturia, urinary incontinence, and hematuria   Hematologic/Lymphatic: negative for easy bruising, bleeding, lymphadenopathy, petechiae and swelling/edema   Endocrine: negative for heat or cold intolerance, tremor, weight changes, change in bowel habits and hair loss   Musculoskeletal: Mild aches and pains since he started hormone therapy back pain.  Neurological: Neuropathy.      Physical Examination :       LMP  (LMP Unknown)     Performance Status:Estimated performance status is ECOG 0    General appearance - well appearing, no in pain or distress ,[place]   Mental status - alert and cooperative   Neck - supple, right submandibular swelling about 1 cm, softer and less tender than before  Mouth exam: No masses

## 2024-11-08 DIAGNOSIS — C50.912 MALIGNANT NEOPLASM OF LEFT BREAST IN FEMALE, ESTROGEN RECEPTOR POSITIVE, UNSPECIFIED SITE OF BREAST (HCC): Primary | ICD-10-CM

## 2024-11-08 DIAGNOSIS — Z17.0 MALIGNANT NEOPLASM OF LEFT BREAST IN FEMALE, ESTROGEN RECEPTOR POSITIVE, UNSPECIFIED SITE OF BREAST (HCC): Primary | ICD-10-CM

## 2024-11-08 DIAGNOSIS — Z90.12 S/P LEFT MASTECTOMY: ICD-10-CM

## 2024-11-08 DIAGNOSIS — Z12.31 SCREENING MAMMOGRAM FOR BREAST CANCER: ICD-10-CM

## 2024-11-25 DIAGNOSIS — Z12.31 ENCOUNTER FOR SCREENING MAMMOGRAM FOR BREAST CANCER: Primary | ICD-10-CM

## 2024-11-25 DIAGNOSIS — Z17.0 MALIGNANT NEOPLASM OF LEFT BREAST IN FEMALE, ESTROGEN RECEPTOR POSITIVE, UNSPECIFIED SITE OF BREAST (HCC): ICD-10-CM

## 2024-11-25 DIAGNOSIS — C50.912 MALIGNANT NEOPLASM OF LEFT BREAST IN FEMALE, ESTROGEN RECEPTOR POSITIVE, UNSPECIFIED SITE OF BREAST (HCC): ICD-10-CM

## 2024-11-25 DIAGNOSIS — Z12.31 ENCOUNTER FOR SCREENING MAMMOGRAM FOR MALIGNANT NEOPLASM OF BREAST: ICD-10-CM

## 2024-11-25 DIAGNOSIS — Z85.3 H/O MALIGNANT NEOPLASM OF BREAST: ICD-10-CM

## 2024-11-25 DIAGNOSIS — M80.08XA OSTEOPOROSIS WITH PATHOLOGICAL FRACTURE OF LUMBAR VERTEBRA (HCC): Primary | ICD-10-CM

## 2024-12-04 RX ORDER — ALENDRONATE SODIUM 70 MG/1
TABLET ORAL
Qty: 12 TABLET | Refills: 2 | Status: SHIPPED | OUTPATIENT
Start: 2024-12-04

## 2025-02-11 DIAGNOSIS — Z12.31 BREAST CANCER SCREENING BY MAMMOGRAM: Primary | ICD-10-CM

## 2025-03-13 ENCOUNTER — HOSPITAL ENCOUNTER (OUTPATIENT)
Dept: WOMENS IMAGING | Age: 70
Discharge: HOME | End: 2025-03-15
Payer: MEDICARE

## 2025-03-13 DIAGNOSIS — M80.08XA OSTEOPOROSIS WITH PATHOLOGICAL FRACTURE OF LUMBAR VERTEBRA (HCC): ICD-10-CM

## 2025-03-13 DIAGNOSIS — Z12.31 BREAST CANCER SCREENING BY MAMMOGRAM: ICD-10-CM

## 2025-03-13 DIAGNOSIS — Z17.0 MALIGNANT NEOPLASM OF LEFT BREAST IN FEMALE, ESTROGEN RECEPTOR POSITIVE, UNSPECIFIED SITE OF BREAST (HCC): ICD-10-CM

## 2025-03-13 DIAGNOSIS — C50.912 MALIGNANT NEOPLASM OF LEFT BREAST IN FEMALE, ESTROGEN RECEPTOR POSITIVE, UNSPECIFIED SITE OF BREAST (HCC): ICD-10-CM

## 2025-03-13 PROCEDURE — 77063 BREAST TOMOSYNTHESIS BI: CPT

## 2025-03-13 PROCEDURE — 77080 DXA BONE DENSITY AXIAL: CPT

## 2025-05-05 DIAGNOSIS — Z17.0 MALIGNANT NEOPLASM OF LEFT BREAST IN FEMALE, ESTROGEN RECEPTOR POSITIVE, UNSPECIFIED SITE OF BREAST (HCC): Primary | ICD-10-CM

## 2025-05-05 DIAGNOSIS — D64.9 NORMOCYTIC ANEMIA: ICD-10-CM

## 2025-05-05 DIAGNOSIS — C50.912 MALIGNANT NEOPLASM OF LEFT BREAST IN FEMALE, ESTROGEN RECEPTOR POSITIVE, UNSPECIFIED SITE OF BREAST (HCC): Primary | ICD-10-CM

## 2025-05-12 ENCOUNTER — OFFICE VISIT (OUTPATIENT)
Dept: ONCOLOGY | Age: 70
End: 2025-05-12
Payer: MEDICARE

## 2025-05-12 VITALS
WEIGHT: 148 LBS | BODY MASS INDEX: 23.18 KG/M2 | DIASTOLIC BLOOD PRESSURE: 78 MMHG | TEMPERATURE: 98.6 F | RESPIRATION RATE: 16 BRPM | SYSTOLIC BLOOD PRESSURE: 135 MMHG | HEART RATE: 78 BPM

## 2025-05-12 DIAGNOSIS — Z86.2 HISTORY OF ANEMIA: ICD-10-CM

## 2025-05-12 DIAGNOSIS — C50.912 MALIGNANT NEOPLASM OF LEFT BREAST IN FEMALE, ESTROGEN RECEPTOR POSITIVE, UNSPECIFIED SITE OF BREAST (HCC): Primary | ICD-10-CM

## 2025-05-12 DIAGNOSIS — Z17.0 MALIGNANT NEOPLASM OF LEFT BREAST IN FEMALE, ESTROGEN RECEPTOR POSITIVE, UNSPECIFIED SITE OF BREAST (HCC): Primary | ICD-10-CM

## 2025-05-12 PROCEDURE — 1159F MED LIST DOCD IN RCRD: CPT | Performed by: INTERNAL MEDICINE

## 2025-05-12 PROCEDURE — 1126F AMNT PAIN NOTED NONE PRSNT: CPT | Performed by: INTERNAL MEDICINE

## 2025-05-12 PROCEDURE — 1123F ACP DISCUSS/DSCN MKR DOCD: CPT | Performed by: INTERNAL MEDICINE

## 2025-05-12 PROCEDURE — 3078F DIAST BP <80 MM HG: CPT | Performed by: INTERNAL MEDICINE

## 2025-05-12 PROCEDURE — 3075F SYST BP GE 130 - 139MM HG: CPT | Performed by: INTERNAL MEDICINE

## 2025-05-12 PROCEDURE — 99214 OFFICE O/P EST MOD 30 MIN: CPT | Performed by: INTERNAL MEDICINE

## 2025-05-12 NOTE — PROGRESS NOTES
Reason for the visit:   History of left breast cancer/osteoporosis      Pertinent Clinical Problems/ Treatments:    Left sided breast cancer,[id number], ER positive/[place] positive/HER-2 positive,  S/p left mastectomy , pt choice,  S/p Dose dense AC x 4  Pt started weekly herceptin /taxol on 7/8/14,  week 12 done 9/23/14. Maintenance Herceptin every 3 weeks , completed one year of therapy.    Osteoporosis with T score of -3.1, we'll continue vitamin D/calcium and Prolia   Status post adjuvant hormone therapy, initially started on Arimidex then switched to Aromasin discontinued May 2019 due to increasing side effects with fatigue.    Summary of the case/HPI :  The patient who had no major comorbidities was diagnosed to have left sided breast cancer.  It was ER positive/ME positive/HER-2 positive, she elected to undergo left mastectomy.patient subsequently started on adjuvant chemotherapy.  She received 4 doses of dose dense AC.  She has handled  chemotherapy well.  She completed one year of herceptin. And after chemotherapy she was maintained of aromatse inhibitor.   She developed osteoporosis and was started on prolia   Chronic back pain and she had compression fracture status post kyphoplasty in 20    Interim history:  Chronic back pain  Persistent lower extremity neuropathy  Status post kyphoplasty  Mammogram done on March 2025 and no evidence of recurrence  Repeated bone density scan done on March 2025 and did show osteopenia calcium and vitamin D  No recent CBC    Past Medical History   has a past medical history of Anxiety, Bone spur, Breast cancer (HCC), Depression, Diverticulosis of large intestine without hemorrhage, Essential hypertension, Hx antineoplastic chemo, Osteoarthritis, Seasonal allergies, Wears dentures, and Wrist fracture.    Surgical History   has a past surgical history that includes Carpal tunnel release (2005); other surgical history (approx 2005); Mastectomy (Left, 03/18/2014);

## 2025-05-16 ENCOUNTER — TELEPHONE (OUTPATIENT)
Dept: ONCOLOGY | Age: 70
End: 2025-05-16

## 2025-05-16 DIAGNOSIS — Z12.31 ENCOUNTER FOR SCREENING MAMMOGRAM FOR MALIGNANT NEOPLASM OF BREAST: Primary | ICD-10-CM

## 2025-05-16 NOTE — TELEPHONE ENCOUNTER
Patient informed mammogram scheduled for 3/16/2026 at 10am and to arrive at 9:45am.  Instructions given.

## 2025-07-16 ENCOUNTER — HOSPITAL ENCOUNTER (OUTPATIENT)
Age: 70
Discharge: HOME | End: 2025-07-16
Payer: MEDICARE

## 2025-07-16 DIAGNOSIS — Z79.899 CHRONIC PRESCRIPTION BENZODIAZEPINE USE: ICD-10-CM

## 2025-07-16 DIAGNOSIS — D64.9 NORMOCYTIC ANEMIA: ICD-10-CM

## 2025-07-16 DIAGNOSIS — Z17.0 MALIGNANT NEOPLASM OF LEFT BREAST IN FEMALE, ESTROGEN RECEPTOR POSITIVE, UNSPECIFIED SITE OF BREAST (HCC): ICD-10-CM

## 2025-07-16 DIAGNOSIS — C50.912 MALIGNANT NEOPLASM OF LEFT BREAST IN FEMALE, ESTROGEN RECEPTOR POSITIVE, UNSPECIFIED SITE OF BREAST (HCC): ICD-10-CM

## 2025-07-16 LAB
ALBUMIN SERPL-MCNC: 4.4 G/DL (ref 3.5–5.2)
ALBUMIN/GLOB SERPL: 1.5 {RATIO} (ref 1–2.5)
ALP SERPL-CCNC: 115 U/L (ref 35–104)
ALT SERPL-CCNC: 29 U/L (ref 10–35)
AMPHET UR QL SCN: NEGATIVE
ANION GAP SERPL CALCULATED.3IONS-SCNC: 12 MMOL/L (ref 9–16)
AST SERPL-CCNC: 33 U/L (ref 10–35)
BARBITURATES UR QL SCN: NEGATIVE
BASOPHILS # BLD: 0.04 K/UL (ref 0–0.2)
BASOPHILS NFR BLD: 0 % (ref 0–2)
BENZODIAZ UR QL: POSITIVE
BILIRUB SERPL-MCNC: 0.4 MG/DL (ref 0–1.2)
BUN SERPL-MCNC: 11 MG/DL (ref 8–23)
BUN/CREAT SERPL: 14 (ref 9–20)
CALCIUM SERPL-MCNC: 9.5 MG/DL (ref 8.6–10.4)
CANNABINOIDS UR QL SCN: NEGATIVE
CHLORIDE SERPL-SCNC: 101 MMOL/L (ref 98–107)
CO2 SERPL-SCNC: 24 MMOL/L (ref 20–31)
COCAINE UR QL SCN: NEGATIVE
CREAT SERPL-MCNC: 0.8 MG/DL (ref 0.5–0.9)
EOSINOPHIL # BLD: 0.17 K/UL (ref 0–0.44)
EOSINOPHILS RELATIVE PERCENT: 2 % (ref 1–4)
ERYTHROCYTE [DISTWIDTH] IN BLOOD BY AUTOMATED COUNT: 13.1 % (ref 11.8–14.4)
FENTANYL UR QL: NEGATIVE
GFR, ESTIMATED: 76 ML/MIN/1.73M2
GLUCOSE SERPL-MCNC: 105 MG/DL (ref 74–99)
HCT VFR BLD AUTO: 43.3 % (ref 36.3–47.1)
HGB BLD-MCNC: 14.1 G/DL (ref 11.9–15.1)
IMM GRANULOCYTES # BLD AUTO: 0.03 K/UL (ref 0–0.3)
IMM GRANULOCYTES NFR BLD: 0 %
LYMPHOCYTES NFR BLD: 3.03 K/UL (ref 1.1–3.7)
LYMPHOCYTES RELATIVE PERCENT: 31 % (ref 24–43)
MCH RBC QN AUTO: 31.4 PG (ref 25.2–33.5)
MCHC RBC AUTO-ENTMCNC: 32.6 G/DL (ref 28.4–34.8)
MCV RBC AUTO: 96.4 FL (ref 82.6–102.9)
METHADONE UR QL: NEGATIVE
MONOCYTES NFR BLD: 0.58 K/UL (ref 0.1–1.2)
MONOCYTES NFR BLD: 6 % (ref 3–12)
NEUTROPHILS NFR BLD: 61 % (ref 36–65)
NEUTS SEG NFR BLD: 6.05 K/UL (ref 1.5–8.1)
NRBC BLD-RTO: 0 PER 100 WBC
OPIATES UR QL SCN: NEGATIVE
OXYCODONE UR QL SCN: NEGATIVE
PCP UR QL SCN: NEGATIVE
PLATELET # BLD AUTO: 181 K/UL (ref 138–453)
PMV BLD AUTO: 10.1 FL (ref 8.1–13.5)
POTASSIUM SERPL-SCNC: 4.5 MMOL/L (ref 3.7–5.3)
PROT SERPL-MCNC: 7.4 G/DL (ref 6.6–8.7)
RBC # BLD AUTO: 4.49 M/UL (ref 3.95–5.11)
SODIUM SERPL-SCNC: 137 MMOL/L (ref 136–145)
TEST INFORMATION: ABNORMAL
WBC OTHER # BLD: 9.9 K/UL (ref 3.5–11.3)

## 2025-07-16 PROCEDURE — 36415 COLL VENOUS BLD VENIPUNCTURE: CPT

## 2025-07-16 PROCEDURE — 80307 DRUG TEST PRSMV CHEM ANLYZR: CPT

## 2025-07-16 PROCEDURE — 85025 COMPLETE CBC W/AUTO DIFF WBC: CPT

## 2025-07-16 PROCEDURE — 80053 COMPREHEN METABOLIC PANEL: CPT

## (undated) DEVICE — SUTURE ETHLN SZ 4-0 L18IN NONABSORBABLE BLK L19MM PS-2 3/8 1667H

## (undated) DEVICE — STRIP,CLOSURE,WOUND,MEDI-STRIP,1/2X4: Brand: MEDLINE

## (undated) DEVICE — NEEDLE HYPO 25GA L1.5IN BLU POLYPR HUB S STL REG BVL STR

## (undated) DEVICE — HAND AND FT PK

## (undated) DEVICE — MEDI-VAC NON-CONDUCTIVE SUCTION TUBING 7MM X 3.7M (12 FT.) L: Brand: CARDINAL HEALTH

## (undated) DEVICE — STAPLER INT L340MM 45MM STD 12 FIRING B FRM PWR + GRIPPING

## (undated) DEVICE — TROCARS: Brand: KII® BALLOON BLUNT TIP SYSTEM

## (undated) DEVICE — XEROFORM OCCLUSIVE GAUZE STRIP OVERWRAP, 3% BISMUTH TRIBROMOPHENATE IN PETROLATUM BLEND: Brand: XEROFORM

## (undated) DEVICE — SYRINGE, LUER LOCK, 10ML: Brand: MEDLINE

## (undated) DEVICE — RELOAD STPL H1-2.5XL35MM VASC THN TISS WHT B FRM NAT ARTC

## (undated) DEVICE — BLADE OPHTH 180DEG CUT SURF BLU STR SHRP DBL BVL GRINDLESS

## (undated) DEVICE — SUTURE VCRL + SZ 0 L27IN ABSRB VLT L26MM UR-6 5/8 CIR VCP603H

## (undated) DEVICE — CLICKLINE SCISSORS INSERT: Brand: CLICKLINE

## (undated) DEVICE — MASTISOL ADHESIVE LIQ 2/3ML

## (undated) DEVICE — K WIRE FIX L229MM DIA1.6MM S STL SGL TRCR PNT STYL SMOOTH

## (undated) DEVICE — TROCAR: Brand: KII FIOS FIRST ENTRY

## (undated) DEVICE — BAG SPEC LAP H6IN DIA3IN 250ML 10 12MM CANN ATTCH MEM WIRE

## (undated) DEVICE — MEDI-VAC NON-CONDUCTIVE TUBING7MM X 30.5 (100FT): Brand: CARDINAL HEALTH

## (undated) DEVICE — BUR OVAL CUT SS 4X54MM

## (undated) DEVICE — SOLUTION IV IRRIG POUR BRL 0.9% SODIUM CHL 2F7124

## (undated) DEVICE — DRESSING SURESITE-123PLUSPAD 2.4 IN X2.8 IN

## (undated) DEVICE — GLOVE SURG SZ 7 L12IN FNGR THK87MIL WHT LTX FREE

## (undated) DEVICE — INTENDED FOR TISSUE SEPARATION, AND OTHER PROCEDURES THAT REQUIRE A SHARP SURGICAL BLADE TO PUNCTURE OR CUT.: Brand: BARD-PARKER ® CARBON RIB-BACK BLADES

## (undated) DEVICE — PACK LAP BASIC

## (undated) DEVICE — SUTURE VCRL + SZ 2-0 L27IN ABSRB WHT SH 1/2 CIR TAPERCUT VCP417H

## (undated) DEVICE — GLOVE ORANGE PI 8   MSG9080

## (undated) DEVICE — TOWEL SURG SM W12XL18IN CLR PLAS TEAR RESIST REINF ADH FRST

## (undated) DEVICE — SUTURE SZ 0 27IN 5/8 CIR UR-6  TAPER PT VIOLET ABSRB VICRYL J603H

## (undated) DEVICE — Device

## (undated) DEVICE — SUTURE VCRL + SZ 3-0 L27IN ABSRB UD L26MM SH 1/2 CIR VCP416H

## (undated) DEVICE — SUTURE MCRYL + SZ 4-0 L27IN ABSRB UD L19MM PS-2 3/8 CIR MCP426H

## (undated) DEVICE — NEEDLE 25GAX5.0MM INJ CARR LOCKE

## (undated) DEVICE — GLOVE ORANGE PI 7 1/2   MSG9075

## (undated) DEVICE — BLADE SAW SS SHORT NARROW 15X7X.4MM

## (undated) DEVICE — SPONGE GZ W4XL4IN COT 12 PLY TYP VII WVN C FLD DSGN

## (undated) DEVICE — STAPLER SKIN L320MM 35MM VASC TISS 12 FIRING B FRM PWR

## (undated) DEVICE — CANNULA ORAL NSL AD CO2 N INTUB O2 DEL DISP TRU LNK

## (undated) DEVICE — PENCIL ES ULT VAC W TELSCP NOSE EZ CLN BLDE 10FT

## (undated) DEVICE — CANNULA IV 18GA L15IN BLNT FILL LUERLOCK HUB MJCT